# Patient Record
Sex: FEMALE | Race: BLACK OR AFRICAN AMERICAN | Employment: FULL TIME | URBAN - METROPOLITAN AREA
[De-identification: names, ages, dates, MRNs, and addresses within clinical notes are randomized per-mention and may not be internally consistent; named-entity substitution may affect disease eponyms.]

---

## 2023-03-07 DIAGNOSIS — E03.9 HYPOTHYROIDISM, UNSPECIFIED: ICD-10-CM

## 2023-03-14 RX ORDER — LEVOTHYROXINE SODIUM 75 UG/1
TABLET ORAL
Qty: 90 TABLET | Refills: 0 | Status: SHIPPED | OUTPATIENT
Start: 2023-03-14 | End: 2023-06-21

## 2023-04-09 DIAGNOSIS — E11.9 TYPE 2 DIABETES MELLITUS WITHOUT COMPLICATIONS (MULTI): ICD-10-CM

## 2023-04-13 RX ORDER — METFORMIN HYDROCHLORIDE 500 MG/1
TABLET, EXTENDED RELEASE ORAL
Qty: 180 TABLET | Refills: 2 | Status: SHIPPED | OUTPATIENT
Start: 2023-04-13 | End: 2024-01-24

## 2023-04-25 ENCOUNTER — OFFICE VISIT (OUTPATIENT)
Dept: PRIMARY CARE | Facility: CLINIC | Age: 68
End: 2023-04-25
Payer: MEDICARE

## 2023-04-25 ENCOUNTER — LAB (OUTPATIENT)
Dept: LAB | Facility: LAB | Age: 68
End: 2023-04-25
Payer: MEDICARE

## 2023-04-25 VITALS
WEIGHT: 231 LBS | BODY MASS INDEX: 43.61 KG/M2 | TEMPERATURE: 97.2 F | SYSTOLIC BLOOD PRESSURE: 124 MMHG | DIASTOLIC BLOOD PRESSURE: 76 MMHG | HEART RATE: 72 BPM | HEIGHT: 61 IN

## 2023-04-25 DIAGNOSIS — E78.2 HYPERLIPEMIA, MIXED: ICD-10-CM

## 2023-04-25 DIAGNOSIS — N18.31 CHRONIC KIDNEY DISEASE, STAGE 3A (MULTI): ICD-10-CM

## 2023-04-25 DIAGNOSIS — E66.01 MORBID OBESITY WITH BMI OF 40.0-44.9, ADULT (MULTI): ICD-10-CM

## 2023-04-25 DIAGNOSIS — Z79.4 TYPE 2 DIABETES MELLITUS WITH CHRONIC KIDNEY DISEASE, WITH LONG-TERM CURRENT USE OF INSULIN, UNSPECIFIED CKD STAGE (MULTI): ICD-10-CM

## 2023-04-25 DIAGNOSIS — E03.9 HYPOTHYROIDISM, UNSPECIFIED TYPE: ICD-10-CM

## 2023-04-25 DIAGNOSIS — D72.9 ABNORMAL WHITE BLOOD CELL COUNT: ICD-10-CM

## 2023-04-25 DIAGNOSIS — E11.22 TYPE 2 DIABETES MELLITUS WITH CHRONIC KIDNEY DISEASE, WITH LONG-TERM CURRENT USE OF INSULIN, UNSPECIFIED CKD STAGE (MULTI): ICD-10-CM

## 2023-04-25 DIAGNOSIS — E11.9 TYPE 2 DIABETES MELLITUS WITHOUT COMPLICATION, WITHOUT LONG-TERM CURRENT USE OF INSULIN (MULTI): ICD-10-CM

## 2023-04-25 DIAGNOSIS — E66.01 OBESITY, MORBID (MULTI): Primary | ICD-10-CM

## 2023-04-25 DIAGNOSIS — Z12.31 SCREENING MAMMOGRAM, ENCOUNTER FOR: ICD-10-CM

## 2023-04-25 DIAGNOSIS — N18.31 CHRONIC KIDNEY DISEASE, STAGE 3A (MULTI): Primary | ICD-10-CM

## 2023-04-25 PROBLEM — M17.11 PRIMARY LOCALIZED OSTEOARTHROSIS OF RIGHT LOWER LEG: Status: ACTIVE | Noted: 2023-04-25

## 2023-04-25 PROBLEM — R53.81 MALAISE: Status: ACTIVE | Noted: 2023-04-25

## 2023-04-25 PROBLEM — G89.29 CHRONIC BILATERAL LOW BACK PAIN WITHOUT SCIATICA: Status: ACTIVE | Noted: 2023-04-25

## 2023-04-25 PROBLEM — M54.50 CHRONIC BILATERAL LOW BACK PAIN WITHOUT SCIATICA: Status: ACTIVE | Noted: 2023-04-25

## 2023-04-25 PROBLEM — M25.569 KNEE PAIN: Status: ACTIVE | Noted: 2023-04-25

## 2023-04-25 PROBLEM — M10.9 GOUT: Status: ACTIVE | Noted: 2023-04-25

## 2023-04-25 PROBLEM — I10 BENIGN ESSENTIAL HYPERTENSION: Status: ACTIVE | Noted: 2023-04-25

## 2023-04-25 LAB
ALANINE AMINOTRANSFERASE (SGPT) (U/L) IN SER/PLAS: 13 U/L (ref 7–45)
ALBUMIN (G/DL) IN SER/PLAS: 4.4 G/DL (ref 3.4–5)
ALBUMIN (MG/L) IN URINE: <7 MG/L
ALBUMIN/CREATININE (UG/MG) IN URINE: NORMAL UG/MG CRT (ref 0–30)
ALKALINE PHOSPHATASE (U/L) IN SER/PLAS: 120 U/L (ref 33–136)
ANION GAP IN SER/PLAS: 17 MMOL/L (ref 10–20)
APPEARANCE, URINE: ABNORMAL
ASPARTATE AMINOTRANSFERASE (SGOT) (U/L) IN SER/PLAS: 19 U/L (ref 9–39)
BACTERIA, URINE: ABNORMAL /HPF
BASOPHILS (10*3/UL) IN BLOOD BY AUTOMATED COUNT: 0.05 X10E9/L (ref 0–0.1)
BASOPHILS/100 LEUKOCYTES IN BLOOD BY AUTOMATED COUNT: 0.4 % (ref 0–2)
BILIRUBIN TOTAL (MG/DL) IN SER/PLAS: 0.4 MG/DL (ref 0–1.2)
BILIRUBIN, URINE: NEGATIVE
BLOOD, URINE: NEGATIVE
BURR CELLS PRESENCE IN BLOOD BY LIGHT MICROSCOPY: NORMAL
CALCIDIOL (25 OH VITAMIN D3) (NG/ML) IN SER/PLAS: 27 NG/ML
CALCIUM (MG/DL) IN SER/PLAS: 10.4 MG/DL (ref 8.6–10.6)
CARBON DIOXIDE, TOTAL (MMOL/L) IN SER/PLAS: 27 MMOL/L (ref 21–32)
CHLORIDE (MMOL/L) IN SER/PLAS: 101 MMOL/L (ref 98–107)
CHOLESTEROL (MG/DL) IN SER/PLAS: 125 MG/DL (ref 0–199)
CHOLESTEROL IN HDL (MG/DL) IN SER/PLAS: 38 MG/DL
CHOLESTEROL/HDL RATIO: 3.3
COLOR, URINE: YELLOW
CREATININE (MG/DL) IN SER/PLAS: 1.11 MG/DL (ref 0.5–1.05)
CREATININE (MG/DL) IN URINE: 106 MG/DL (ref 20–320)
EOSINOPHILS (10*3/UL) IN BLOOD BY AUTOMATED COUNT: 0.08 X10E9/L (ref 0–0.7)
EOSINOPHILS/100 LEUKOCYTES IN BLOOD BY AUTOMATED COUNT: 0.7 % (ref 0–6)
ERYTHROCYTE DISTRIBUTION WIDTH (RATIO) BY AUTOMATED COUNT: 15.7 % (ref 11.5–14.5)
ERYTHROCYTE MEAN CORPUSCULAR HEMOGLOBIN CONCENTRATION (G/DL) BY AUTOMATED: 30.4 G/DL (ref 32–36)
ERYTHROCYTE MEAN CORPUSCULAR VOLUME (FL) BY AUTOMATED COUNT: 85 FL (ref 80–100)
ERYTHROCYTES (10*6/UL) IN BLOOD BY AUTOMATED COUNT: 5.01 X10E12/L (ref 4–5.2)
ESTIMATED AVERAGE GLUCOSE FOR HBA1C: 143 MG/DL
GFR FEMALE: 54 ML/MIN/1.73M2
GLUCOSE (MG/DL) IN SER/PLAS: 118 MG/DL (ref 74–99)
GLUCOSE, URINE: NEGATIVE MG/DL
HEMATOCRIT (%) IN BLOOD BY AUTOMATED COUNT: 42.4 % (ref 36–46)
HEMOGLOBIN (G/DL) IN BLOOD: 12.9 G/DL (ref 12–16)
HEMOGLOBIN A1C/HEMOGLOBIN TOTAL IN BLOOD: 6.6 %
IMMATURE GRANULOCYTES/100 LEUKOCYTES IN BLOOD BY AUTOMATED COUNT: 0.4 % (ref 0–0.9)
KETONES, URINE: NEGATIVE MG/DL
LDL: 53 MG/DL (ref 0–99)
LEUKOCYTE ESTERASE, URINE: ABNORMAL
LEUKOCYTES (10*3/UL) IN BLOOD BY AUTOMATED COUNT: 12 X10E9/L (ref 4.4–11.3)
LYMPHOCYTES (10*3/UL) IN BLOOD BY AUTOMATED COUNT: 5.41 X10E9/L (ref 1.2–4.8)
LYMPHOCYTES/100 LEUKOCYTES IN BLOOD BY AUTOMATED COUNT: 45 % (ref 13–44)
MONOCYTES (10*3/UL) IN BLOOD BY AUTOMATED COUNT: 0.77 X10E9/L (ref 0.1–1)
MONOCYTES/100 LEUKOCYTES IN BLOOD BY AUTOMATED COUNT: 6.4 % (ref 2–10)
MUCUS, URINE: ABNORMAL /LPF
NEUTROPHILS (10*3/UL) IN BLOOD BY AUTOMATED COUNT: 5.67 X10E9/L (ref 1.2–7.7)
NEUTROPHILS/100 LEUKOCYTES IN BLOOD BY AUTOMATED COUNT: 47.1 % (ref 40–80)
NITRITE, URINE: NEGATIVE
NRBC (PER 100 WBCS) BY AUTOMATED COUNT: 0 /100 WBC (ref 0–0)
PH, URINE: 6 (ref 5–8)
PLATELETS (10*3/UL) IN BLOOD AUTOMATED COUNT: 368 X10E9/L (ref 150–450)
POLYCHROMASIA IN BLOOD BY LIGHT MICROSCOPY: NORMAL
POTASSIUM (MMOL/L) IN SER/PLAS: 3.4 MMOL/L (ref 3.5–5.3)
PROTEIN TOTAL: 7.8 G/DL (ref 6.4–8.2)
PROTEIN, URINE: NEGATIVE MG/DL
RBC MORPHOLOGY IN BLOOD: NORMAL
RBC, URINE: 5 /HPF (ref 0–5)
SODIUM (MMOL/L) IN SER/PLAS: 142 MMOL/L (ref 136–145)
SPECIFIC GRAVITY, URINE: 1.01 (ref 1–1.03)
SQUAMOUS EPITHELIAL CELLS, URINE: 14 /HPF
THYROTROPIN (MIU/L) IN SER/PLAS BY DETECTION LIMIT <= 0.05 MIU/L: 2.79 MIU/L (ref 0.44–3.98)
TRIGLYCERIDE (MG/DL) IN SER/PLAS: 169 MG/DL (ref 0–149)
URATE (MG/DL) IN SER/PLAS: 6.4 MG/DL (ref 2.3–6.7)
UREA NITROGEN (MG/DL) IN SER/PLAS: 11 MG/DL (ref 6–23)
UROBILINOGEN, URINE: <2 MG/DL (ref 0–1.9)
VLDL: 34 MG/DL (ref 0–40)
WBC, URINE: 3 /HPF (ref 0–5)

## 2023-04-25 PROCEDURE — 3008F BODY MASS INDEX DOCD: CPT | Performed by: INTERNAL MEDICINE

## 2023-04-25 PROCEDURE — 85025 COMPLETE CBC W/AUTO DIFF WBC: CPT

## 2023-04-25 PROCEDURE — 82043 UR ALBUMIN QUANTITATIVE: CPT

## 2023-04-25 PROCEDURE — 3074F SYST BP LT 130 MM HG: CPT | Performed by: INTERNAL MEDICINE

## 2023-04-25 PROCEDURE — G0439 PPPS, SUBSEQ VISIT: HCPCS | Performed by: INTERNAL MEDICINE

## 2023-04-25 PROCEDURE — 36415 COLL VENOUS BLD VENIPUNCTURE: CPT

## 2023-04-25 PROCEDURE — 80053 COMPREHEN METABOLIC PANEL: CPT

## 2023-04-25 PROCEDURE — 84443 ASSAY THYROID STIM HORMONE: CPT

## 2023-04-25 PROCEDURE — 81001 URINALYSIS AUTO W/SCOPE: CPT

## 2023-04-25 PROCEDURE — 99214 OFFICE O/P EST MOD 30 MIN: CPT | Performed by: INTERNAL MEDICINE

## 2023-04-25 PROCEDURE — 3078F DIAST BP <80 MM HG: CPT | Performed by: INTERNAL MEDICINE

## 2023-04-25 PROCEDURE — 80061 LIPID PANEL: CPT

## 2023-04-25 PROCEDURE — 87086 URINE CULTURE/COLONY COUNT: CPT

## 2023-04-25 PROCEDURE — 1160F RVW MEDS BY RX/DR IN RCRD: CPT | Performed by: INTERNAL MEDICINE

## 2023-04-25 PROCEDURE — 82570 ASSAY OF URINE CREATININE: CPT

## 2023-04-25 PROCEDURE — 84550 ASSAY OF BLOOD/URIC ACID: CPT

## 2023-04-25 PROCEDURE — 1170F FXNL STATUS ASSESSED: CPT | Performed by: INTERNAL MEDICINE

## 2023-04-25 PROCEDURE — 1159F MED LIST DOCD IN RCRD: CPT | Performed by: INTERNAL MEDICINE

## 2023-04-25 PROCEDURE — 83036 HEMOGLOBIN GLYCOSYLATED A1C: CPT

## 2023-04-25 PROCEDURE — 82306 VITAMIN D 25 HYDROXY: CPT

## 2023-04-25 PROCEDURE — 1036F TOBACCO NON-USER: CPT | Performed by: INTERNAL MEDICINE

## 2023-04-25 RX ORDER — ALLOPURINOL 300 MG/1
300 TABLET ORAL DAILY
COMMUNITY
End: 2023-05-31

## 2023-04-25 RX ORDER — AMLODIPINE AND VALSARTAN 10; 320 MG/1; MG/1
1 TABLET ORAL DAILY
COMMUNITY
End: 2023-05-31

## 2023-04-25 RX ORDER — INDAPAMIDE 2.5 MG/1
1 TABLET ORAL DAILY
COMMUNITY
Start: 2015-04-07 | End: 2023-05-31

## 2023-04-25 RX ORDER — NAPROXEN SODIUM 220 MG/1
81 TABLET, FILM COATED ORAL DAILY
COMMUNITY
Start: 2019-05-30 | End: 2023-11-09 | Stop reason: ALTCHOICE

## 2023-04-25 SDOH — HEALTH STABILITY: PHYSICAL HEALTH: ON AVERAGE, HOW MANY MINUTES DO YOU ENGAGE IN EXERCISE AT THIS LEVEL?: 10 MIN

## 2023-04-25 SDOH — HEALTH STABILITY: PHYSICAL HEALTH: ON AVERAGE, HOW MANY DAYS PER WEEK DO YOU ENGAGE IN MODERATE TO STRENUOUS EXERCISE (LIKE A BRISK WALK)?: 1 DAY

## 2023-04-25 ASSESSMENT — PATIENT HEALTH QUESTIONNAIRE - PHQ9
2. FEELING DOWN, DEPRESSED OR HOPELESS: NOT AT ALL
SUM OF ALL RESPONSES TO PHQ9 QUESTIONS 1 AND 2: 0
1. LITTLE INTEREST OR PLEASURE IN DOING THINGS: NOT AT ALL
1. LITTLE INTEREST OR PLEASURE IN DOING THINGS: NOT AT ALL
2. FEELING DOWN, DEPRESSED OR HOPELESS: NOT AT ALL
SUM OF ALL RESPONSES TO PHQ9 QUESTIONS 1 & 2: 0

## 2023-04-25 ASSESSMENT — LIFESTYLE VARIABLES
HOW MANY STANDARD DRINKS CONTAINING ALCOHOL DO YOU HAVE ON A TYPICAL DAY: 1 OR 2
HOW OFTEN DO YOU HAVE SIX OR MORE DRINKS ON ONE OCCASION: NEVER
AUDIT-C TOTAL SCORE: 1
HOW OFTEN DO YOU HAVE A DRINK CONTAINING ALCOHOL: MONTHLY OR LESS
SKIP TO QUESTIONS 9-10: 1

## 2023-04-25 ASSESSMENT — ACTIVITIES OF DAILY LIVING (ADL)
TAKING_MEDICATION: INDEPENDENT
DOING_HOUSEWORK: INDEPENDENT
MANAGING_FINANCES: INDEPENDENT
GROCERY_SHOPPING: INDEPENDENT
DRESSING: INDEPENDENT
BATHING: INDEPENDENT

## 2023-04-25 ASSESSMENT — ENCOUNTER SYMPTOMS
ARTHRALGIAS: 1
BACK PAIN: 1

## 2023-04-25 NOTE — PROGRESS NOTES
"Subjective   Patient ID: Hope Dale is a 67 y.o. female who presents for Medicare Annual Wellness Visit Subsequent (Pt present today for wellness visit. ls).    Patient presents for wellness exam and follow-up.  She has been compliant with her medications, diet but not exercise.  She reports that her sugars are in the low 100s at home.  She denies any headaches, no dizziness, no chest pain or shortness of breath.  She denies abdominal pain no nausea vomiting or diarrhea.  She reports baseline back and knee pain.         Review of Systems   Musculoskeletal:  Positive for arthralgias and back pain.       Objective   /76   Pulse 72   Temp 36.2 °C (97.2 °F)   Ht 1.549 m (5' 1\")   Wt 105 kg (231 lb)   BMI 43.65 kg/m²     Physical Exam  Constitutional:       Appearance: Normal appearance.   Cardiovascular:      Rate and Rhythm: Normal rate and regular rhythm.      Heart sounds: No murmur heard.     No gallop.   Pulmonary:      Effort: No respiratory distress.      Breath sounds: No wheezing or rales.   Abdominal:      General: There is no distension.      Palpations: There is no mass.      Tenderness: There is no abdominal tenderness. There is no guarding.   Musculoskeletal:      Right lower leg: No edema.      Left lower leg: No edema.   Neurological:      Mental Status: She is alert.         Assessment/Plan   Diagnoses and all orders for this visit:  Obesity, morbid (CMS/HCC)-we will refer to nutritionist for further evaluation  Type 2 diabetes mellitus without complication, without long-term current use of insulin (CMS/[facility])-we will check hemoglobin A1c.  Ophthalmology appointment has been done.  Chronic kidney disease, stage 3a-we will recheck creatinine  -     CBC and Auto Differential; Future  -     Vitamin D, Total; Future  -     Uric Acid; Future  -     Urinalysis with Reflex Microscopic; Future  -     Albumin , Urine Random; Future  -     Comprehensive Metabolic Panel; Future  -     Hemoglobin A1c; " Future  Hypothyroidism, unspecified type-we will recheck TSH  -     TSH with reflex to Free T4 if abnormal; Future  Hyperlipemia, mixed-we will check a lipid profile  -     Lipid Panel; Future  Screening mammogram, encounter for  -     BI mammo bilateral screening tomosynthesis; Future  Back and knee pain-stable symptoms  Type 2 diabetes mellitus with chronic kidney disease, with long-term current use of insulin, unspecified CKD stage (CMS/[facility])  -     Hemoglobin A1c; Future  Morbid obesity with BMI of 40.0-44.9, adult (CMS/[facility])-we will refer to nutritionist.  Health maintenance-colonoscopy has been done.  We will schedule mammogram.  Bone density is up-to-date.  Refuses immunizations.  GYN appointment for Pap.  Back and knee pain-stable symptoms

## 2023-04-25 NOTE — PATIENT INSTRUCTIONS
Please take medication as prescribed.  Follow-up in 3 months.  Obtain fasting blood work and urine.  Schedule your mammogram.  Schedule appoint with nutritionist

## 2023-04-27 LAB — URINE CULTURE: ABNORMAL

## 2023-05-30 DIAGNOSIS — I10 ESSENTIAL (PRIMARY) HYPERTENSION: ICD-10-CM

## 2023-05-30 DIAGNOSIS — M10.9 GOUT, UNSPECIFIED: ICD-10-CM

## 2023-05-31 RX ORDER — AMLODIPINE AND VALSARTAN 10; 320 MG/1; MG/1
TABLET ORAL
Qty: 90 TABLET | Refills: 2 | Status: SHIPPED | OUTPATIENT
Start: 2023-05-31 | End: 2024-03-04

## 2023-05-31 RX ORDER — INDAPAMIDE 2.5 MG/1
TABLET ORAL
Qty: 90 TABLET | Refills: 2 | Status: SHIPPED | OUTPATIENT
Start: 2023-05-31 | End: 2024-03-04

## 2023-05-31 RX ORDER — ALLOPURINOL 300 MG/1
TABLET ORAL
Qty: 90 TABLET | Refills: 2 | Status: SHIPPED | OUTPATIENT
Start: 2023-05-31 | End: 2024-03-04

## 2023-07-27 ENCOUNTER — LAB (OUTPATIENT)
Dept: LAB | Facility: LAB | Age: 68
End: 2023-07-27
Payer: MEDICARE

## 2023-07-27 ENCOUNTER — OFFICE VISIT (OUTPATIENT)
Dept: PRIMARY CARE | Facility: CLINIC | Age: 68
End: 2023-07-27
Payer: MEDICARE

## 2023-07-27 VITALS
HEART RATE: 72 BPM | WEIGHT: 229 LBS | BODY MASS INDEX: 43.27 KG/M2 | DIASTOLIC BLOOD PRESSURE: 76 MMHG | SYSTOLIC BLOOD PRESSURE: 114 MMHG

## 2023-07-27 DIAGNOSIS — G89.29 CHRONIC BILATERAL LOW BACK PAIN WITHOUT SCIATICA: ICD-10-CM

## 2023-07-27 DIAGNOSIS — R31.9 HEMATURIA, UNSPECIFIED TYPE: Primary | ICD-10-CM

## 2023-07-27 DIAGNOSIS — I10 BENIGN ESSENTIAL HYPERTENSION: ICD-10-CM

## 2023-07-27 DIAGNOSIS — R53.81 MALAISE: ICD-10-CM

## 2023-07-27 DIAGNOSIS — E78.2 HYPERLIPEMIA, MIXED: ICD-10-CM

## 2023-07-27 DIAGNOSIS — E66.01 MORBID OBESITY WITH BMI OF 40.0-44.9, ADULT (MULTI): Primary | ICD-10-CM

## 2023-07-27 DIAGNOSIS — M25.569 CHRONIC KNEE PAIN, UNSPECIFIED LATERALITY: ICD-10-CM

## 2023-07-27 DIAGNOSIS — M54.50 CHRONIC BILATERAL LOW BACK PAIN WITHOUT SCIATICA: ICD-10-CM

## 2023-07-27 DIAGNOSIS — E11.9 TYPE 2 DIABETES MELLITUS WITHOUT COMPLICATION, WITHOUT LONG-TERM CURRENT USE OF INSULIN (MULTI): ICD-10-CM

## 2023-07-27 DIAGNOSIS — G89.29 CHRONIC KNEE PAIN, UNSPECIFIED LATERALITY: ICD-10-CM

## 2023-07-27 LAB
ANION GAP IN SER/PLAS: 17 MMOL/L (ref 10–20)
APPEARANCE, URINE: ABNORMAL
BASOPHILS (10*3/UL) IN BLOOD BY AUTOMATED COUNT: 0.05 X10E9/L (ref 0–0.1)
BASOPHILS/100 LEUKOCYTES IN BLOOD BY AUTOMATED COUNT: 0.6 % (ref 0–2)
BILIRUBIN, URINE: NEGATIVE
BLOOD, URINE: NEGATIVE
CALCIUM (MG/DL) IN SER/PLAS: 10.6 MG/DL (ref 8.6–10.6)
CARBON DIOXIDE, TOTAL (MMOL/L) IN SER/PLAS: 27 MMOL/L (ref 21–32)
CHLORIDE (MMOL/L) IN SER/PLAS: 101 MMOL/L (ref 98–107)
CHOLESTEROL (MG/DL) IN SER/PLAS: 115 MG/DL (ref 0–199)
CHOLESTEROL IN HDL (MG/DL) IN SER/PLAS: 35.8 MG/DL
CHOLESTEROL/HDL RATIO: 3.2
COLOR, URINE: YELLOW
CREATININE (MG/DL) IN SER/PLAS: 1.06 MG/DL (ref 0.5–1.05)
EOSINOPHILS (10*3/UL) IN BLOOD BY AUTOMATED COUNT: 0.05 X10E9/L (ref 0–0.7)
EOSINOPHILS/100 LEUKOCYTES IN BLOOD BY AUTOMATED COUNT: 0.6 % (ref 0–6)
ERYTHROCYTE DISTRIBUTION WIDTH (RATIO) BY AUTOMATED COUNT: 15.9 % (ref 11.5–14.5)
ERYTHROCYTE MEAN CORPUSCULAR HEMOGLOBIN CONCENTRATION (G/DL) BY AUTOMATED: 31.6 G/DL (ref 32–36)
ERYTHROCYTE MEAN CORPUSCULAR VOLUME (FL) BY AUTOMATED COUNT: 84 FL (ref 80–100)
ERYTHROCYTES (10*6/UL) IN BLOOD BY AUTOMATED COUNT: 4.45 X10E12/L (ref 4–5.2)
FERRITIN (UG/LL) IN SER/PLAS: 155 UG/L (ref 8–150)
GFR FEMALE: 56 ML/MIN/1.73M2
GLUCOSE (MG/DL) IN SER/PLAS: 121 MG/DL (ref 74–99)
GLUCOSE, URINE: NEGATIVE MG/DL
HEMATOCRIT (%) IN BLOOD BY AUTOMATED COUNT: 37.3 % (ref 36–46)
HEMOGLOBIN (G/DL) IN BLOOD: 11.8 G/DL (ref 12–16)
HYALINE CASTS, URINE: ABNORMAL /LPF
IMMATURE GRANULOCYTES/100 LEUKOCYTES IN BLOOD BY AUTOMATED COUNT: 0.3 % (ref 0–0.9)
IRON (UG/DL) IN SER/PLAS: 37 UG/DL (ref 35–150)
IRON BINDING CAPACITY (UG/DL) IN SER/PLAS: 299 UG/DL (ref 240–445)
IRON SATURATION (%) IN SER/PLAS: 12 % (ref 25–45)
KETONES, URINE: NEGATIVE MG/DL
LDL: 55 MG/DL (ref 0–99)
LEUKOCYTE ESTERASE, URINE: ABNORMAL
LEUKOCYTES (10*3/UL) IN BLOOD BY AUTOMATED COUNT: 9.1 X10E9/L (ref 4.4–11.3)
LYMPHOCYTES (10*3/UL) IN BLOOD BY AUTOMATED COUNT: 3.73 X10E9/L (ref 1.2–4.8)
LYMPHOCYTES/100 LEUKOCYTES IN BLOOD BY AUTOMATED COUNT: 41.1 % (ref 13–44)
MONOCYTES (10*3/UL) IN BLOOD BY AUTOMATED COUNT: 0.58 X10E9/L (ref 0.1–1)
MONOCYTES/100 LEUKOCYTES IN BLOOD BY AUTOMATED COUNT: 6.4 % (ref 2–10)
MUCUS, URINE: ABNORMAL /LPF
NEUTROPHILS (10*3/UL) IN BLOOD BY AUTOMATED COUNT: 4.64 X10E9/L (ref 1.2–7.7)
NEUTROPHILS/100 LEUKOCYTES IN BLOOD BY AUTOMATED COUNT: 51 % (ref 40–80)
NITRITE, URINE: NEGATIVE
NRBC (PER 100 WBCS) BY AUTOMATED COUNT: 0 /100 WBC (ref 0–0)
PH, URINE: 5 (ref 5–8)
PLATELETS (10*3/UL) IN BLOOD AUTOMATED COUNT: 273 X10E9/L (ref 150–450)
POC FINGERSTICK BLOOD GLUCOSE: 138 MG/DL (ref 70–100)
POC HEMOGLOBIN A1C: 6.6 % (ref 4.2–6.5)
POTASSIUM (MMOL/L) IN SER/PLAS: 3.8 MMOL/L (ref 3.5–5.3)
PROTEIN, URINE: NEGATIVE MG/DL
RBC MORPHOLOGY IN BLOOD: NORMAL
RBC, URINE: 22 /HPF (ref 0–5)
SODIUM (MMOL/L) IN SER/PLAS: 141 MMOL/L (ref 136–145)
SPECIFIC GRAVITY, URINE: 1.02 (ref 1–1.03)
SQUAMOUS EPITHELIAL CELLS, URINE: 64 /HPF
TRIGLYCERIDE (MG/DL) IN SER/PLAS: 119 MG/DL (ref 0–149)
UREA NITROGEN (MG/DL) IN SER/PLAS: 16 MG/DL (ref 6–23)
UROBILINOGEN, URINE: <2 MG/DL (ref 0–1.9)
VLDL: 24 MG/DL (ref 0–40)
WBC, URINE: 12 /HPF (ref 0–5)

## 2023-07-27 PROCEDURE — 1036F TOBACCO NON-USER: CPT | Performed by: INTERNAL MEDICINE

## 2023-07-27 PROCEDURE — 80048 BASIC METABOLIC PNL TOTAL CA: CPT

## 2023-07-27 PROCEDURE — 82728 ASSAY OF FERRITIN: CPT

## 2023-07-27 PROCEDURE — 3044F HG A1C LEVEL LT 7.0%: CPT | Performed by: INTERNAL MEDICINE

## 2023-07-27 PROCEDURE — 1160F RVW MEDS BY RX/DR IN RCRD: CPT | Performed by: INTERNAL MEDICINE

## 2023-07-27 PROCEDURE — 1159F MED LIST DOCD IN RCRD: CPT | Performed by: INTERNAL MEDICINE

## 2023-07-27 PROCEDURE — 83540 ASSAY OF IRON: CPT

## 2023-07-27 PROCEDURE — 80061 LIPID PANEL: CPT

## 2023-07-27 PROCEDURE — 83550 IRON BINDING TEST: CPT

## 2023-07-27 PROCEDURE — 3078F DIAST BP <80 MM HG: CPT | Performed by: INTERNAL MEDICINE

## 2023-07-27 PROCEDURE — 3008F BODY MASS INDEX DOCD: CPT | Performed by: INTERNAL MEDICINE

## 2023-07-27 PROCEDURE — 83036 HEMOGLOBIN GLYCOSYLATED A1C: CPT | Performed by: INTERNAL MEDICINE

## 2023-07-27 PROCEDURE — 36415 COLL VENOUS BLD VENIPUNCTURE: CPT

## 2023-07-27 PROCEDURE — 3074F SYST BP LT 130 MM HG: CPT | Performed by: INTERNAL MEDICINE

## 2023-07-27 PROCEDURE — 85025 COMPLETE CBC W/AUTO DIFF WBC: CPT

## 2023-07-27 PROCEDURE — 99214 OFFICE O/P EST MOD 30 MIN: CPT | Performed by: INTERNAL MEDICINE

## 2023-07-27 PROCEDURE — 81001 URINALYSIS AUTO W/SCOPE: CPT

## 2023-07-27 PROCEDURE — 82962 GLUCOSE BLOOD TEST: CPT | Performed by: INTERNAL MEDICINE

## 2023-07-27 ASSESSMENT — ENCOUNTER SYMPTOMS
POLYDIPSIA: 0
JOINT SWELLING: 0
FREQUENCY: 0
BACK PAIN: 0
EYE DISCHARGE: 0
BLOOD IN STOOL: 0
DIZZINESS: 0
NAUSEA: 0
HEADACHES: 0
FLANK PAIN: 0
FATIGUE: 0
BRUISES/BLEEDS EASILY: 0
SHORTNESS OF BREATH: 0
TROUBLE SWALLOWING: 0
ACTIVITY CHANGE: 0
VOICE CHANGE: 0
DIAPHORESIS: 0
NECK STIFFNESS: 0
NECK PAIN: 0
STRIDOR: 0
ADENOPATHY: 0
DIFFICULTY URINATING: 0
NUMBNESS: 0
ANAL BLEEDING: 0
EYE REDNESS: 0
RECTAL PAIN: 0
ARTHRALGIAS: 1
EYE PAIN: 0
MYALGIAS: 0
COLOR CHANGE: 0
SINUS PRESSURE: 0
FACIAL ASYMMETRY: 0
CONSTIPATION: 0
HEMATURIA: 0
POLYPHAGIA: 0
APPETITE CHANGE: 0
VOMITING: 0
TREMORS: 0
EYE ITCHING: 0
SEIZURES: 0
RHINORRHEA: 0
SINUS PAIN: 0
ABDOMINAL DISTENTION: 0
COUGH: 0
WOUND: 0
LIGHT-HEADEDNESS: 0
ABDOMINAL PAIN: 0
PHOTOPHOBIA: 0
CHOKING: 0
WHEEZING: 0
SPEECH DIFFICULTY: 0
DYSURIA: 0
SORE THROAT: 0
DIARRHEA: 0
SLEEP DISTURBANCE: 0
CHEST TIGHTNESS: 0
FACIAL SWELLING: 0
CHILLS: 0
WEAKNESS: 0
PALPITATIONS: 0

## 2023-07-27 NOTE — PROGRESS NOTES
Subjective   Patient ID: Hope Dale is a 68 y.o. female who presents for Medicare Annual Wellness Visit Subsequent.    Patient presents for follow-up.  She has been compliant with her medications and diet but not exercise.  She reports baseline arthritis symptoms involving her knees and hips.  She denies any headaches, no dizziness but does complain of allergy symptoms.  She denies any chest pain or shortness of breath, no abdominal pain no nausea vomiting or diarrhea.         Review of Systems   Constitutional:  Negative for activity change, appetite change, chills, diaphoresis and fatigue.   HENT:  Negative for congestion, dental problem, drooling, ear discharge, ear pain, facial swelling, hearing loss, mouth sores, nosebleeds, postnasal drip, rhinorrhea, sinus pressure, sinus pain, sneezing, sore throat, tinnitus, trouble swallowing and voice change.    Eyes:  Negative for photophobia, pain, discharge, redness, itching and visual disturbance.   Respiratory:  Negative for cough, choking, chest tightness, shortness of breath, wheezing and stridor.    Cardiovascular:  Negative for chest pain, palpitations and leg swelling.   Gastrointestinal:  Negative for abdominal distention, abdominal pain, anal bleeding, blood in stool, constipation, diarrhea, nausea, rectal pain and vomiting.   Endocrine: Negative for cold intolerance, heat intolerance, polydipsia, polyphagia and polyuria.   Genitourinary:  Negative for decreased urine volume, difficulty urinating, dysuria, enuresis, flank pain, frequency, genital sores, hematuria and urgency.   Musculoskeletal:  Positive for arthralgias. Negative for back pain, gait problem, joint swelling, myalgias, neck pain and neck stiffness.   Skin:  Negative for color change, pallor, rash and wound.   Neurological:  Negative for dizziness, tremors, seizures, syncope, facial asymmetry, speech difficulty, weakness, light-headedness, numbness and headaches.   Hematological:  Negative  for adenopathy. Does not bruise/bleed easily.   Psychiatric/Behavioral:  Negative for sleep disturbance.        Objective   /76   Pulse 72   Wt 104 kg (229 lb)   BMI 43.27 kg/m²     Physical Exam  Constitutional:       Appearance: Normal appearance.   Cardiovascular:      Rate and Rhythm: Normal rate and regular rhythm.      Heart sounds: No murmur heard.     No gallop.   Pulmonary:      Effort: No respiratory distress.      Breath sounds: No wheezing or rales.   Abdominal:      General: There is no distension.      Palpations: There is no mass.      Tenderness: There is no abdominal tenderness. There is no guarding.   Musculoskeletal:      Right lower leg: No edema.      Left lower leg: No edema.   Neurological:      Mental Status: She is alert.         Assessment/Plan   Diagnoses and all orders for this visit:  Morbid obesity with BMI of 40.0-44.9, adult (CMS/[facility])-diet and exercise  Type 2 diabetes mellitus without complication, without long-term current use of insulin (CMS/[facility])-hemoglobin A1c was 6.6.  Ophthalmology appointment has been done.  -     POCT Fingerstick Glucose manually resulted  -     POCT glycosylated hemoglobin (Hb A1C) manually resulted  Benign essential hypertension-low-salt diet and exercise okay  Malaise  Hyperlipemia, mixed-fuses medications.  Will check lipid profile  Chronic knee pain, unspecified laterality- stable symptoms  Chronic bilateral low back pain without sciatica  CRI- recheck creatinine  Elevated white blood cell count-we will recheck today.  Health maintenance-she will schedule her mammogram.  Refuses immunizations.  Colonoscopy has been done.  Hematuria-we will recheck a urinalysis  Hypokalemia-we will recheck potassium

## 2023-09-27 PROBLEM — E55.9 VITAMIN D DEFICIENCY: Status: ACTIVE | Noted: 2023-09-27

## 2023-09-27 PROBLEM — Q76.49 SPINE DEFORMITY: Status: ACTIVE | Noted: 2023-09-27

## 2023-09-27 PROBLEM — E07.9 THYROID DISEASE: Status: ACTIVE | Noted: 2023-09-27

## 2023-09-27 PROBLEM — I10 HYPERTENSION: Status: ACTIVE | Noted: 2023-09-27

## 2023-09-27 PROBLEM — R31.9 HEMATURIA: Status: ACTIVE | Noted: 2023-09-27

## 2023-09-27 PROBLEM — N95.1 MENOPAUSE SYNDROME: Status: ACTIVE | Noted: 2023-09-27

## 2023-09-27 PROBLEM — R73.9 HYPERGLYCEMIA: Status: ACTIVE | Noted: 2023-09-27

## 2023-09-27 PROBLEM — H83.02 LABYRINTHITIS OF LEFT EAR: Status: ACTIVE | Noted: 2023-09-27

## 2023-09-27 PROBLEM — R30.0 DYSURIA: Status: ACTIVE | Noted: 2023-09-27

## 2023-09-27 PROBLEM — M65.321 TRIGGER INDEX FINGER OF RIGHT HAND: Status: ACTIVE | Noted: 2023-09-27

## 2023-09-27 PROBLEM — R94.31 ABNORMAL EKG: Status: ACTIVE | Noted: 2023-09-27

## 2023-09-27 LAB
POC CREATININE: 0.7 MG/DL (ref 0.6–1.3)
POCT GFR - DATA CONVERSION: >60

## 2023-09-27 RX ORDER — ERGOCALCIFEROL 1.25 MG/1
1 CAPSULE ORAL WEEKLY
COMMUNITY
Start: 2022-04-14 | End: 2023-11-09 | Stop reason: ALTCHOICE

## 2023-10-03 ENCOUNTER — PROCEDURE VISIT (OUTPATIENT)
Dept: UROLOGY | Facility: CLINIC | Age: 68
End: 2023-10-03
Payer: MEDICARE

## 2023-10-03 VITALS
BODY MASS INDEX: 43.43 KG/M2 | DIASTOLIC BLOOD PRESSURE: 74 MMHG | SYSTOLIC BLOOD PRESSURE: 116 MMHG | HEART RATE: 87 BPM | WEIGHT: 230 LBS | HEIGHT: 61 IN | TEMPERATURE: 97.2 F

## 2023-10-03 DIAGNOSIS — R31.9 HEMATURIA, UNSPECIFIED TYPE: Primary | ICD-10-CM

## 2023-10-03 LAB
POC APPEARANCE, URINE: CLEAR
POC BILIRUBIN, URINE: NEGATIVE
POC BLOOD, URINE: ABNORMAL
POC COLOR, URINE: YELLOW
POC GLUCOSE, URINE: NEGATIVE MG/DL
POC KETONES, URINE: NEGATIVE MG/DL
POC LEUKOCYTES, URINE: ABNORMAL
POC NITRITE,URINE: NEGATIVE
POC PH, URINE: 5.5 PH
POC PROTEIN, URINE: NEGATIVE MG/DL
POC SPECIFIC GRAVITY, URINE: 1.01
POC UROBILINOGEN, URINE: 0.2 EU/DL

## 2023-10-03 PROCEDURE — 52000 CYSTOURETHROSCOPY: CPT | Performed by: UROLOGY

## 2023-10-03 PROCEDURE — 81002 URINALYSIS NONAUTO W/O SCOPE: CPT | Performed by: UROLOGY

## 2023-10-03 ASSESSMENT — PAIN SCALES - GENERAL: PAINLEVEL: 0-NO PAIN

## 2023-10-03 NOTE — PROGRESS NOTES
HPI  Hope Dale is a 68 y.o. female presenting for cystoscopy for microhematuria.    CTU: negative other than ovarian findings, will discuss with her GYN      Procedure:  Patient presents for Cystourethroscopy due to hematuria. UA results were reviewed.    Risks, benefits, and alternative were discussed in detail.  Patient appears to understand and agrees to proceed.  Patient has signed the procedure consent form.    Cystoscopy findings:  Introitus: unremarkable introitus finding  Prolapse:  none  Urethra: unremarkable urethra finding  Bladder: unremarkable bladder finding      Assessment/Plan   Microhematuria with Cystoscopy  -Cystoscopy was completed today due to microhematuria and demonstrated normal findings.  She will follow up with NP to receive a UA that will be once yearly.    Cystoscopy was completed today without complication and she tolerated the procedure well. The anterior urethra is normal. Careful inspection of the bladder revealed there to be no evidence of tumor, stones, foreign bodies or diverticula. There is clear efflux from each orifice.    The patient was advised that given a negative workup for microscopic hematuria we watch for resolution over the next 5 years. If the patients hematuria persists at the 5 year laurie a repeat workup will be performed and as long as this is negative this is considered a non life threatening form of microscopic hematuria.        Scribe Attestation  By signing my name below, I, Fan Tran   attest that this documentation has been prepared under the direction and in the presence of Mariola Suresh MD.

## 2023-10-04 ENCOUNTER — APPOINTMENT (OUTPATIENT)
Dept: UROLOGY | Facility: CLINIC | Age: 68
End: 2023-10-04
Payer: MEDICARE

## 2023-11-01 ENCOUNTER — APPOINTMENT (OUTPATIENT)
Dept: PRIMARY CARE | Facility: CLINIC | Age: 68
End: 2023-11-01
Payer: MEDICARE

## 2023-11-09 ENCOUNTER — OFFICE VISIT (OUTPATIENT)
Dept: OBSTETRICS AND GYNECOLOGY | Facility: CLINIC | Age: 68
End: 2023-11-09
Payer: MEDICARE

## 2023-11-09 VITALS
HEIGHT: 61 IN | BODY MASS INDEX: 43.61 KG/M2 | SYSTOLIC BLOOD PRESSURE: 122 MMHG | WEIGHT: 231 LBS | DIASTOLIC BLOOD PRESSURE: 78 MMHG

## 2023-11-09 DIAGNOSIS — R19.00 PELVIC MASS: Primary | ICD-10-CM

## 2023-11-09 DIAGNOSIS — R97.8 OTHER ABNORMAL TUMOR MARKERS: ICD-10-CM

## 2023-11-09 PROCEDURE — 1159F MED LIST DOCD IN RCRD: CPT | Performed by: OBSTETRICS & GYNECOLOGY

## 2023-11-09 PROCEDURE — 1160F RVW MEDS BY RX/DR IN RCRD: CPT | Performed by: OBSTETRICS & GYNECOLOGY

## 2023-11-09 PROCEDURE — 3078F DIAST BP <80 MM HG: CPT | Performed by: OBSTETRICS & GYNECOLOGY

## 2023-11-09 PROCEDURE — 1036F TOBACCO NON-USER: CPT | Performed by: OBSTETRICS & GYNECOLOGY

## 2023-11-09 PROCEDURE — 3044F HG A1C LEVEL LT 7.0%: CPT | Performed by: OBSTETRICS & GYNECOLOGY

## 2023-11-09 PROCEDURE — 3074F SYST BP LT 130 MM HG: CPT | Performed by: OBSTETRICS & GYNECOLOGY

## 2023-11-09 PROCEDURE — 99204 OFFICE O/P NEW MOD 45 MIN: CPT | Performed by: OBSTETRICS & GYNECOLOGY

## 2023-11-09 PROCEDURE — 3008F BODY MASS INDEX DOCD: CPT | Performed by: OBSTETRICS & GYNECOLOGY

## 2023-11-09 PROCEDURE — 1126F AMNT PAIN NOTED NONE PRSNT: CPT | Performed by: OBSTETRICS & GYNECOLOGY

## 2023-11-09 NOTE — PROGRESS NOTES
Hope Dale is a 68 y.o. female who presents with a chief complaint of Follow-up (Patient was referred for ovarian mass)      SUBJECTIVE  Patient presents adnexal mass.  She has a history of a hysterectomy in the past for  with an abnormal CT scan.  She was having urinary symptoms and had a CT urogram which showed uterine fibroids.  She is having no pain.  She had discussed this at length its about 6 cm in size but a bit better view is needed    Past Medical History:   Diagnosis Date    Elevated blood-pressure reading, without diagnosis of hypertension     Elevated blood pressure reading without diagnosis of hypertension    Other conditions influencing health status     Arthritis    Personal history of other diseases of the circulatory system     History of hypertension    Personal history of other diseases of the musculoskeletal system and connective tissue     Personal history of gout     Past Surgical History:   Procedure Laterality Date    COLONOSCOPY  2021    rpt -    FOOT SURGERY  2014    Foot Surgery    HAND TENDON SURGERY  2014    Hand Incision Tendon Sheath Of A Finger    HYSTERECTOMY  2014    Hysterectomy    KNEE SURGERY  2014    Knee Surgery     Social History     Socioeconomic History    Marital status:      Spouse name: None    Number of children: None    Years of education: None    Highest education level: None   Occupational History    None   Tobacco Use    Smoking status: Former     Packs/day: 0.25     Years: 17.00     Additional pack years: 0.00     Total pack years: 4.25     Types: Cigarettes     Quit date:      Years since quittin.8    Smokeless tobacco: Never   Vaping Use    Vaping Use: Never used   Substance and Sexual Activity    Alcohol use: Not Currently    Drug use: Never    Sexual activity: Defer   Other Topics Concern    None   Social History Narrative    None     Social Determinants of Health     Financial Resource Strain: Not on file  "  Food Insecurity: Not on file   Transportation Needs: Not on file   Physical Activity: Insufficiently Active (4/25/2023)    Exercise Vital Sign     Days of Exercise per Week: 1 day     Minutes of Exercise per Session: 10 min   Stress: Not on file   Social Connections: Not on file   Intimate Partner Violence: Not on file   Housing Stability: Not on file     Family History   Problem Relation Name Age of Onset    Aneurysm Mother      Heart failure Father      Kidney disease Brother      Hypertension Other      Diabetes Other         OB History   No obstetric history on file.       OBJECTIVE  No Known Allergies   (Not in a hospital admission)       Review of Systems  History obtained from the patient  General ROS: negative  Psychological ROS: negative  Gastrointestinal ROS: no abdominal pain, change in bowel habits, or black or bloody stools  Musculoskeletal ROS: negative  Physical Exam  General Appearance: awake, alert, oriented, in no acute distress, well developed, well nourished, and in no acute distress  Skin: there are no suspicious lesions or rashes of concern, skin color, texture, turgor are normal; there are no bruises, rashes or lesions.  Head/Face: NCAT  Eyes: No gross abnormalities., PERRL, and EOMI  Abdomen: Soft, non-tender, normal bowel sounds; no bruits, organomegaly or masses.  Extremities: Extremities warm to touch, pink, with no edema.  Musculoskeletal: negative    /78   Ht 1.549 m (5' 1\")   Wt 105 kg (231 lb)   BMI 43.65 kg/m²    Problem List Items Addressed This Visit    None  Visit Diagnoses       Pelvic mass    -  Primary    Relevant Orders    Cancer Antigen 125    Carcinoembryonic Antigen    Cancer Antigen 19-9    US pelvis transvaginal    Other abnormal tumor markers        Relevant Orders    Cancer Antigen 125    Carcinoembryonic Antigen    Cancer Antigen 19-9           Follow up after ultrasound      "

## 2023-11-13 ENCOUNTER — LAB (OUTPATIENT)
Dept: LAB | Facility: LAB | Age: 68
End: 2023-11-13
Payer: MEDICARE

## 2023-11-13 ENCOUNTER — HOSPITAL ENCOUNTER (OUTPATIENT)
Dept: RADIOLOGY | Facility: CLINIC | Age: 68
Discharge: HOME | End: 2023-11-13
Payer: MEDICARE

## 2023-11-13 DIAGNOSIS — D64.9 ANEMIA, UNSPECIFIED TYPE: ICD-10-CM

## 2023-11-13 DIAGNOSIS — D72.9 ABNORMAL WHITE BLOOD CELL COUNT: ICD-10-CM

## 2023-11-13 DIAGNOSIS — R97.8 OTHER ABNORMAL TUMOR MARKERS: ICD-10-CM

## 2023-11-13 DIAGNOSIS — D64.9 ANEMIA, UNSPECIFIED TYPE: Primary | ICD-10-CM

## 2023-11-13 DIAGNOSIS — E11.9 TYPE 2 DIABETES MELLITUS WITHOUT COMPLICATION, WITHOUT LONG-TERM CURRENT USE OF INSULIN (MULTI): ICD-10-CM

## 2023-11-13 DIAGNOSIS — R19.00 PELVIC MASS: ICD-10-CM

## 2023-11-13 DIAGNOSIS — E11.9 TYPE 2 DIABETES MELLITUS WITHOUT COMPLICATION, WITHOUT LONG-TERM CURRENT USE OF INSULIN (MULTI): Primary | ICD-10-CM

## 2023-11-13 DIAGNOSIS — N18.31 CHRONIC KIDNEY DISEASE, STAGE 3A (MULTI): ICD-10-CM

## 2023-11-13 LAB
ANION GAP SERPL CALC-SCNC: 15 MMOL/L (ref 10–20)
BASOPHILS # BLD AUTO: 0.08 X10*3/UL (ref 0–0.1)
BASOPHILS NFR BLD AUTO: 0.9 %
BUN SERPL-MCNC: 17 MG/DL (ref 6–23)
CALCIUM SERPL-MCNC: 10.1 MG/DL (ref 8.6–10.6)
CANCER AG125 SERPL-ACNC: <3 U/ML (ref 0–30.2)
CANCER AG19-9 SERPL-ACNC: <4 U/ML
CEA SERPL-MCNC: 1.9 UG/L
CHLORIDE SERPL-SCNC: 103 MMOL/L (ref 98–107)
CO2 SERPL-SCNC: 28 MMOL/L (ref 21–32)
CREAT SERPL-MCNC: 1.09 MG/DL (ref 0.5–1.05)
EOSINOPHIL # BLD AUTO: 0.11 X10*3/UL (ref 0–0.7)
EOSINOPHIL NFR BLD AUTO: 1.3 %
ERYTHROCYTE [DISTWIDTH] IN BLOOD BY AUTOMATED COUNT: 15.6 % (ref 11.5–14.5)
EST. AVERAGE GLUCOSE BLD GHB EST-MCNC: 140 MG/DL
FERRITIN SERPL-MCNC: 147 NG/ML (ref 8–150)
GFR SERPL CREATININE-BSD FRML MDRD: 55 ML/MIN/1.73M*2
GLUCOSE SERPL-MCNC: 131 MG/DL (ref 74–99)
HBA1C MFR BLD: 6.5 %
HCT VFR BLD AUTO: 38 % (ref 36–46)
HGB BLD-MCNC: 11.7 G/DL (ref 12–16)
IMM GRANULOCYTES # BLD AUTO: 0.03 X10*3/UL (ref 0–0.7)
IMM GRANULOCYTES NFR BLD AUTO: 0.4 % (ref 0–0.9)
IRON SATN MFR SERPL: 15 % (ref 25–45)
IRON SERPL-MCNC: 46 UG/DL (ref 35–150)
LYMPHOCYTES # BLD AUTO: 3.87 X10*3/UL (ref 1.2–4.8)
LYMPHOCYTES NFR BLD AUTO: 45.3 %
MCH RBC QN AUTO: 25.8 PG (ref 26–34)
MCHC RBC AUTO-ENTMCNC: 30.8 G/DL (ref 32–36)
MCV RBC AUTO: 84 FL (ref 80–100)
MONOCYTES # BLD AUTO: 0.62 X10*3/UL (ref 0.1–1)
MONOCYTES NFR BLD AUTO: 7.3 %
NEUTROPHILS # BLD AUTO: 3.83 X10*3/UL (ref 1.2–7.7)
NEUTROPHILS NFR BLD AUTO: 44.8 %
NRBC BLD-RTO: 0 /100 WBCS (ref 0–0)
PLATELET # BLD AUTO: 330 X10*3/UL (ref 150–450)
POTASSIUM SERPL-SCNC: 3.7 MMOL/L (ref 3.5–5.3)
RBC # BLD AUTO: 4.54 X10*6/UL (ref 4–5.2)
SODIUM SERPL-SCNC: 142 MMOL/L (ref 136–145)
TIBC SERPL-MCNC: 299 UG/DL (ref 240–445)
UIBC SERPL-MCNC: 253 UG/DL (ref 110–370)
WBC # BLD AUTO: 8.5 X10*3/UL (ref 4.4–11.3)

## 2023-11-13 PROCEDURE — 76830 TRANSVAGINAL US NON-OB: CPT

## 2023-11-13 PROCEDURE — 85025 COMPLETE CBC W/AUTO DIFF WBC: CPT

## 2023-11-13 PROCEDURE — 83550 IRON BINDING TEST: CPT

## 2023-11-13 PROCEDURE — 76830 TRANSVAGINAL US NON-OB: CPT | Performed by: RADIOLOGY

## 2023-11-13 PROCEDURE — 86304 IMMUNOASSAY TUMOR CA 125: CPT

## 2023-11-13 PROCEDURE — 76856 US EXAM PELVIC COMPLETE: CPT | Performed by: RADIOLOGY

## 2023-11-13 PROCEDURE — 83540 ASSAY OF IRON: CPT

## 2023-11-13 PROCEDURE — 82378 CARCINOEMBRYONIC ANTIGEN: CPT

## 2023-11-13 PROCEDURE — 86301 IMMUNOASSAY TUMOR CA 19-9: CPT

## 2023-11-13 PROCEDURE — 82728 ASSAY OF FERRITIN: CPT

## 2023-11-13 PROCEDURE — 83036 HEMOGLOBIN GLYCOSYLATED A1C: CPT

## 2023-11-13 PROCEDURE — 80048 BASIC METABOLIC PNL TOTAL CA: CPT

## 2023-11-13 PROCEDURE — 36415 COLL VENOUS BLD VENIPUNCTURE: CPT

## 2023-11-30 ENCOUNTER — OFFICE VISIT (OUTPATIENT)
Dept: OBSTETRICS AND GYNECOLOGY | Facility: CLINIC | Age: 68
End: 2023-11-30
Payer: MEDICARE

## 2023-11-30 VITALS
DIASTOLIC BLOOD PRESSURE: 80 MMHG | SYSTOLIC BLOOD PRESSURE: 124 MMHG | HEIGHT: 61 IN | WEIGHT: 230 LBS | BODY MASS INDEX: 43.43 KG/M2

## 2023-11-30 DIAGNOSIS — N83.209 CYST OF OVARY, UNSPECIFIED LATERALITY: Primary | ICD-10-CM

## 2023-11-30 PROCEDURE — 1159F MED LIST DOCD IN RCRD: CPT | Performed by: OBSTETRICS & GYNECOLOGY

## 2023-11-30 PROCEDURE — 3079F DIAST BP 80-89 MM HG: CPT | Performed by: OBSTETRICS & GYNECOLOGY

## 2023-11-30 PROCEDURE — 1126F AMNT PAIN NOTED NONE PRSNT: CPT | Performed by: OBSTETRICS & GYNECOLOGY

## 2023-11-30 PROCEDURE — 99214 OFFICE O/P EST MOD 30 MIN: CPT | Performed by: OBSTETRICS & GYNECOLOGY

## 2023-11-30 PROCEDURE — 3044F HG A1C LEVEL LT 7.0%: CPT | Performed by: OBSTETRICS & GYNECOLOGY

## 2023-11-30 PROCEDURE — 3008F BODY MASS INDEX DOCD: CPT | Performed by: OBSTETRICS & GYNECOLOGY

## 2023-11-30 PROCEDURE — 1160F RVW MEDS BY RX/DR IN RCRD: CPT | Performed by: OBSTETRICS & GYNECOLOGY

## 2023-11-30 PROCEDURE — 1036F TOBACCO NON-USER: CPT | Performed by: OBSTETRICS & GYNECOLOGY

## 2023-11-30 PROCEDURE — 3074F SYST BP LT 130 MM HG: CPT | Performed by: OBSTETRICS & GYNECOLOGY

## 2023-11-30 NOTE — PROGRESS NOTES
Hope Dale is a 68 y.o. female who presents with a chief complaint of Results      SUBJECTIVE  Patient presents to go over her ultrasound and tumor markers.  She does have a small cyst on her left ovary but her tumor markers were all within normal limits.  She and I discussed this at length we talked about ovarian cancer and about tumor markers and about her decreased risk because of the markers being normal.    Past Medical History:   Diagnosis Date    Elevated blood-pressure reading, without diagnosis of hypertension     Elevated blood pressure reading without diagnosis of hypertension    Other conditions influencing health status     Arthritis    Personal history of other diseases of the circulatory system     History of hypertension    Personal history of other diseases of the musculoskeletal system and connective tissue     Personal history of gout     Past Surgical History:   Procedure Laterality Date    COLONOSCOPY  2021    rpt     FOOT SURGERY  2014    Foot Surgery    HAND TENDON SURGERY  2014    Hand Incision Tendon Sheath Of A Finger    HYSTERECTOMY  2014    Hysterectomy    KNEE SURGERY  2014    Knee Surgery     Social History     Socioeconomic History    Marital status:      Spouse name: None    Number of children: None    Years of education: None    Highest education level: None   Occupational History    None   Tobacco Use    Smoking status: Former     Packs/day: 0.25     Years: 17.00     Additional pack years: 0.00     Total pack years: 4.25     Types: Cigarettes     Quit date:      Years since quittin.9    Smokeless tobacco: Never   Vaping Use    Vaping Use: Never used   Substance and Sexual Activity    Alcohol use: Not Currently    Drug use: Never    Sexual activity: Defer   Other Topics Concern    None   Social History Narrative    None     Social Determinants of Health     Financial Resource Strain: Not on file   Food Insecurity: Not on file  "  Transportation Needs: Not on file   Physical Activity: Insufficiently Active (4/25/2023)    Exercise Vital Sign     Days of Exercise per Week: 1 day     Minutes of Exercise per Session: 10 min   Stress: Not on file   Social Connections: Not on file   Intimate Partner Violence: Not on file   Housing Stability: Not on file     Family History   Problem Relation Name Age of Onset    Aneurysm Mother      Heart failure Father      Kidney disease Brother      Hypertension Other      Diabetes Other         OB History   No obstetric history on file.       OBJECTIVE  No Known Allergies   (Not in a hospital admission)       Review of Systems  History obtained from the patient  General ROS: negative  Gastrointestinal ROS: no abdominal pain, change in bowel habits, or black or bloody stools  Musculoskeletal ROS: negative  Physical Exam  General Appearance: awake, alert, oriented, in no acute distress, well developed, well nourished, and in no acute distress  Skin: there are no suspicious lesions or rashes of concern, skin color, texture, turgor are normal; there are no bruises, rashes or lesions.  Head/Face: NCAT  Eyes: No gross abnormalities., PERRL, and EOMI  Abdomen: Soft, non-tender, normal bowel sounds; no bruits, organomegaly or masses.  Extremities: Extremities warm to touch, pink, with no edema.  Musculoskeletal: negative    /80   Ht 1.549 m (5' 1\")   Wt 104 kg (230 lb)   BMI 43.46 kg/m²    Problem List Items Addressed This Visit    None  Visit Diagnoses       Cyst of ovary, unspecified laterality    -  Primary    Relevant Orders    US pelvis transvaginal        Patient felt reassured with the negative tumor markers.  She was given the choice of following with the ultrasound in 6 months or to have it removed laparoscopically.  She would like to try and see what the next ultrasound shows in 6 months.      "

## 2023-12-27 ENCOUNTER — APPOINTMENT (OUTPATIENT)
Dept: PRIMARY CARE | Facility: CLINIC | Age: 68
End: 2023-12-27
Payer: MEDICARE

## 2024-01-12 DIAGNOSIS — E03.9 HYPOTHYROIDISM, UNSPECIFIED: ICD-10-CM

## 2024-01-12 RX ORDER — LEVOTHYROXINE SODIUM 75 UG/1
75 TABLET ORAL DAILY
Qty: 90 TABLET | Refills: 0 | Status: SHIPPED | OUTPATIENT
Start: 2024-01-12 | End: 2024-04-11

## 2024-01-24 DIAGNOSIS — E11.9 TYPE 2 DIABETES MELLITUS WITHOUT COMPLICATIONS (MULTI): ICD-10-CM

## 2024-01-24 RX ORDER — METFORMIN HYDROCHLORIDE 500 MG/1
TABLET, EXTENDED RELEASE ORAL
Qty: 180 TABLET | Refills: 2 | Status: SHIPPED | OUTPATIENT
Start: 2024-01-24

## 2024-02-28 ENCOUNTER — OFFICE VISIT (OUTPATIENT)
Dept: PRIMARY CARE | Facility: CLINIC | Age: 69
End: 2024-02-28
Payer: MEDICARE

## 2024-02-28 VITALS
WEIGHT: 226 LBS | BODY MASS INDEX: 42.7 KG/M2 | SYSTOLIC BLOOD PRESSURE: 120 MMHG | DIASTOLIC BLOOD PRESSURE: 80 MMHG | HEART RATE: 72 BPM

## 2024-02-28 DIAGNOSIS — E03.9 HYPOTHYROIDISM, UNSPECIFIED TYPE: ICD-10-CM

## 2024-02-28 DIAGNOSIS — E11.22 TYPE 2 DIABETES MELLITUS WITH CHRONIC KIDNEY DISEASE, WITH LONG-TERM CURRENT USE OF INSULIN, UNSPECIFIED CKD STAGE (MULTI): ICD-10-CM

## 2024-02-28 DIAGNOSIS — I10 BENIGN ESSENTIAL HYPERTENSION: ICD-10-CM

## 2024-02-28 DIAGNOSIS — E78.2 HYPERLIPEMIA, MIXED: ICD-10-CM

## 2024-02-28 DIAGNOSIS — Z78.0 MENOPAUSE: Primary | ICD-10-CM

## 2024-02-28 DIAGNOSIS — M1A.9XX0 CHRONIC GOUT WITHOUT TOPHUS, UNSPECIFIED CAUSE, UNSPECIFIED SITE: ICD-10-CM

## 2024-02-28 DIAGNOSIS — Z79.4 TYPE 2 DIABETES MELLITUS WITH CHRONIC KIDNEY DISEASE, WITH LONG-TERM CURRENT USE OF INSULIN, UNSPECIFIED CKD STAGE (MULTI): ICD-10-CM

## 2024-02-28 DIAGNOSIS — E66.01 CLASS 3 SEVERE OBESITY DUE TO EXCESS CALORIES WITH SERIOUS COMORBIDITY AND BODY MASS INDEX (BMI) OF 40.0 TO 44.9 IN ADULT (MULTI): ICD-10-CM

## 2024-02-28 DIAGNOSIS — N18.31 CHRONIC KIDNEY DISEASE, STAGE 3A (MULTI): ICD-10-CM

## 2024-02-28 LAB
POC FINGERSTICK BLOOD GLUCOSE: 115 MG/DL (ref 70–100)
POC HEMOGLOBIN A1C: 6.9 % (ref 4.2–6.5)

## 2024-02-28 PROCEDURE — 3008F BODY MASS INDEX DOCD: CPT | Performed by: INTERNAL MEDICINE

## 2024-02-28 PROCEDURE — 1126F AMNT PAIN NOTED NONE PRSNT: CPT | Performed by: INTERNAL MEDICINE

## 2024-02-28 PROCEDURE — 1036F TOBACCO NON-USER: CPT | Performed by: INTERNAL MEDICINE

## 2024-02-28 PROCEDURE — 99214 OFFICE O/P EST MOD 30 MIN: CPT | Performed by: INTERNAL MEDICINE

## 2024-02-28 PROCEDURE — 3074F SYST BP LT 130 MM HG: CPT | Performed by: INTERNAL MEDICINE

## 2024-02-28 PROCEDURE — 3079F DIAST BP 80-89 MM HG: CPT | Performed by: INTERNAL MEDICINE

## 2024-02-28 PROCEDURE — 1160F RVW MEDS BY RX/DR IN RCRD: CPT | Performed by: INTERNAL MEDICINE

## 2024-02-28 PROCEDURE — G0439 PPPS, SUBSEQ VISIT: HCPCS | Performed by: INTERNAL MEDICINE

## 2024-02-28 PROCEDURE — 82962 GLUCOSE BLOOD TEST: CPT | Performed by: INTERNAL MEDICINE

## 2024-02-28 PROCEDURE — 1159F MED LIST DOCD IN RCRD: CPT | Performed by: INTERNAL MEDICINE

## 2024-02-28 PROCEDURE — 83036 HEMOGLOBIN GLYCOSYLATED A1C: CPT | Performed by: INTERNAL MEDICINE

## 2024-02-28 RX ORDER — ROSUVASTATIN CALCIUM 5 MG/1
5 TABLET, COATED ORAL DAILY
Qty: 30 TABLET | Refills: 5 | Status: SHIPPED | OUTPATIENT
Start: 2024-02-28 | End: 2024-08-26

## 2024-02-28 ASSESSMENT — ENCOUNTER SYMPTOMS
ARTHRALGIAS: 1
VOMITING: 0
SEIZURES: 0
BLOOD IN STOOL: 0
SPEECH DIFFICULTY: 0
HEADACHES: 0
CHILLS: 0
POLYPHAGIA: 0
COLOR CHANGE: 0
FATIGUE: 0
DIFFICULTY URINATING: 0
ABDOMINAL PAIN: 0
CHOKING: 0
ADENOPATHY: 0
SLEEP DISTURBANCE: 0
ANAL BLEEDING: 0
NAUSEA: 0
RECTAL PAIN: 0
NECK STIFFNESS: 0
DIZZINESS: 0
NECK PAIN: 0
SHORTNESS OF BREATH: 0
NUMBNESS: 0
VOICE CHANGE: 0
FACIAL ASYMMETRY: 0
CHEST TIGHTNESS: 0
SORE THROAT: 0
EYE DISCHARGE: 0
PHOTOPHOBIA: 0
FACIAL SWELLING: 0
EYE ITCHING: 0
APPETITE CHANGE: 0
FREQUENCY: 0
LIGHT-HEADEDNESS: 0
EYE REDNESS: 0
RHINORRHEA: 1
ACTIVITY CHANGE: 0
MYALGIAS: 0
SINUS PRESSURE: 0
DIAPHORESIS: 0
SINUS PAIN: 0
TROUBLE SWALLOWING: 0
HEMATURIA: 0
STRIDOR: 0
TREMORS: 0
COUGH: 0
WEAKNESS: 0
WHEEZING: 0
BRUISES/BLEEDS EASILY: 0
POLYDIPSIA: 0
DYSURIA: 0
FLANK PAIN: 0
PALPITATIONS: 0
DIARRHEA: 0
CONSTIPATION: 0
BACK PAIN: 1
EYE PAIN: 0
WOUND: 0
JOINT SWELLING: 0
ABDOMINAL DISTENTION: 0

## 2024-02-28 ASSESSMENT — PAIN SCALES - GENERAL: PAINLEVEL: 0-NO PAIN

## 2024-02-28 NOTE — PROGRESS NOTES
Subjective   Patient ID: Hope Dale is a 68 y.o. female who presents for Follow-up and Medicare Annual Wellness Visit Subsequent.    Patient presents for wellness exam and follow-up.  She has been compliant with her medications, diet but not exercise.  She reports baseline back pain.  She denies any headaches, no dizziness, does complain of allergy symptoms.  She denies any chest pain or shortness of breath, no abdominal pain no nausea vomiting or diarrhea.  She reports no new musculoskeletal complaints.         Review of Systems   Constitutional:  Negative for activity change, appetite change, chills, diaphoresis and fatigue.   HENT:  Positive for rhinorrhea. Negative for congestion, dental problem, drooling, ear discharge, ear pain, facial swelling, hearing loss, mouth sores, nosebleeds, postnasal drip, sinus pressure, sinus pain, sneezing, sore throat, tinnitus, trouble swallowing and voice change.    Eyes:  Negative for photophobia, pain, discharge, redness, itching and visual disturbance.   Respiratory:  Negative for cough, choking, chest tightness, shortness of breath, wheezing and stridor.    Cardiovascular:  Negative for chest pain, palpitations and leg swelling.   Gastrointestinal:  Negative for abdominal distention, abdominal pain, anal bleeding, blood in stool, constipation, diarrhea, nausea, rectal pain and vomiting.   Endocrine: Negative for cold intolerance, heat intolerance, polydipsia, polyphagia and polyuria.   Genitourinary:  Negative for decreased urine volume, difficulty urinating, dysuria, enuresis, flank pain, frequency, genital sores, hematuria and urgency.   Musculoskeletal:  Positive for arthralgias and back pain. Negative for gait problem, joint swelling, myalgias, neck pain and neck stiffness.   Skin:  Negative for color change, pallor, rash and wound.   Neurological:  Negative for dizziness, tremors, seizures, syncope, facial asymmetry, speech difficulty, weakness, light-headedness,  numbness and headaches.   Hematological:  Negative for adenopathy. Does not bruise/bleed easily.   Psychiatric/Behavioral:  Negative for sleep disturbance.        Objective   /80   Pulse 72   Wt 103 kg (226 lb)   BMI 42.70 kg/m²     Physical Exam  Constitutional:       Appearance: Normal appearance.   Cardiovascular:      Rate and Rhythm: Normal rate and regular rhythm.      Heart sounds: No murmur heard.     No gallop.   Pulmonary:      Effort: No respiratory distress.      Breath sounds: No wheezing or rales.   Abdominal:      General: There is no distension.      Palpations: There is no mass.      Tenderness: There is no abdominal tenderness. There is no guarding.   Musculoskeletal:      Right lower leg: No edema.      Left lower leg: No edema.   Neurological:      Mental Status: She is alert.         Assessment/Plan   Diagnoses and all orders for this visit:  Menopause-schedule bone density  -     XR DEXA bone density; Future  Body mass index (BMI) 40.0-44.9, adult (CMS/[facility])-diet and exercise  Chronic kidney disease, stage 3a (CMS/[facility])-creatinine has been stable  Type 2 diabetes mellitus with chronic kidney disease, with long-term current use of insulin, unspecified CKD stage (CMS/[facility])-hemoglobin A1c was 6.9.  Diet and exercise.  Refuses medication adjustment  -     POCT glycosylated hemoglobin (Hb A1C) manually resulted  -     POCT Fingerstick Glucose manually resulted  Hyperlipemia, mixed-schedule cardiac score.  Start Crestor.  -     CT cardiac scoring wo IV contrast; Future  -     rosuvastatin (Crestor) 5 mg tablet; Take 1 tablet (5 mg) by mouth once daily.  Class 3 severe obesity due to excess calories with serious comorbidity and body mass index (BMI) of 40.0 to 44.9 in adult (CMS/[facility])-diet and exercise  Benign essential hypertension-stable on present medication  Hypothyroidism, unspecified type-continue with Synthroid  Chronic gout without tophus, unspecified cause, unspecified site-we will  continue with allopurinol.  Health maintenance-colonoscopy has been done.  Refuses immunizations.  Mammogram has been done.  Will schedule bone density.  Advance care planning discussed.  She will schedule an eye and dental appointment

## 2024-03-02 DIAGNOSIS — M10.9 GOUT, UNSPECIFIED: ICD-10-CM

## 2024-03-02 DIAGNOSIS — I10 ESSENTIAL (PRIMARY) HYPERTENSION: ICD-10-CM

## 2024-03-04 RX ORDER — ALLOPURINOL 300 MG/1
TABLET ORAL
Qty: 90 TABLET | Refills: 2 | Status: SHIPPED | OUTPATIENT
Start: 2024-03-04

## 2024-03-04 RX ORDER — AMLODIPINE AND VALSARTAN 10; 320 MG/1; MG/1
TABLET ORAL
Qty: 90 TABLET | Refills: 2 | Status: SHIPPED | OUTPATIENT
Start: 2024-03-04

## 2024-03-04 RX ORDER — INDAPAMIDE 2.5 MG/1
TABLET ORAL
Qty: 90 TABLET | Refills: 2 | Status: SHIPPED | OUTPATIENT
Start: 2024-03-04

## 2024-03-19 ENCOUNTER — HOSPITAL ENCOUNTER (OUTPATIENT)
Dept: RADIOLOGY | Facility: CLINIC | Age: 69
End: 2024-03-19
Payer: MEDICARE

## 2024-05-21 ENCOUNTER — APPOINTMENT (OUTPATIENT)
Dept: OBSTETRICS AND GYNECOLOGY | Facility: CLINIC | Age: 69
End: 2024-05-21
Payer: MEDICARE

## 2024-05-28 ENCOUNTER — APPOINTMENT (OUTPATIENT)
Dept: PRIMARY CARE | Facility: CLINIC | Age: 69
End: 2024-05-28
Payer: MEDICARE

## 2024-05-30 ENCOUNTER — APPOINTMENT (OUTPATIENT)
Dept: RADIOLOGY | Facility: CLINIC | Age: 69
End: 2024-05-30
Payer: MEDICARE

## 2024-06-06 ENCOUNTER — APPOINTMENT (OUTPATIENT)
Dept: PRIMARY CARE | Facility: CLINIC | Age: 69
End: 2024-06-06
Payer: MEDICARE

## 2024-06-13 ENCOUNTER — APPOINTMENT (OUTPATIENT)
Dept: PRIMARY CARE | Facility: CLINIC | Age: 69
End: 2024-06-13
Payer: MEDICARE

## 2024-07-02 DIAGNOSIS — E78.2 HYPERLIPEMIA, MIXED: ICD-10-CM

## 2024-07-02 RX ORDER — ROSUVASTATIN CALCIUM 5 MG/1
5 TABLET, COATED ORAL DAILY
Qty: 90 TABLET | Refills: 1 | Status: SHIPPED | OUTPATIENT
Start: 2024-07-02

## 2024-07-09 ENCOUNTER — APPOINTMENT (OUTPATIENT)
Dept: PRIMARY CARE | Facility: CLINIC | Age: 69
End: 2024-07-09
Payer: MEDICARE

## 2024-07-21 DIAGNOSIS — E03.9 HYPOTHYROIDISM, UNSPECIFIED: ICD-10-CM

## 2024-07-22 RX ORDER — LEVOTHYROXINE SODIUM 75 UG/1
75 TABLET ORAL DAILY
Qty: 90 TABLET | Refills: 0 | Status: SHIPPED | OUTPATIENT
Start: 2024-07-22

## 2024-07-25 ENCOUNTER — APPOINTMENT (OUTPATIENT)
Dept: PRIMARY CARE | Facility: CLINIC | Age: 69
End: 2024-07-25
Payer: MEDICARE

## 2024-07-25 VITALS
BODY MASS INDEX: 42.03 KG/M2 | SYSTOLIC BLOOD PRESSURE: 126 MMHG | HEIGHT: 61 IN | WEIGHT: 222.6 LBS | DIASTOLIC BLOOD PRESSURE: 78 MMHG | HEART RATE: 76 BPM

## 2024-07-25 DIAGNOSIS — E11.9 TYPE 2 DIABETES MELLITUS WITHOUT COMPLICATION, WITHOUT LONG-TERM CURRENT USE OF INSULIN (MULTI): ICD-10-CM

## 2024-07-25 DIAGNOSIS — E66.01 MORBID OBESITY WITH BMI OF 40.0-44.9, ADULT (MULTI): ICD-10-CM

## 2024-07-25 DIAGNOSIS — Z12.31 SCREENING MAMMOGRAM FOR BREAST CANCER: ICD-10-CM

## 2024-07-25 DIAGNOSIS — M54.50 CHRONIC BILATERAL LOW BACK PAIN WITHOUT SCIATICA: ICD-10-CM

## 2024-07-25 DIAGNOSIS — M17.11 PRIMARY LOCALIZED OSTEOARTHROSIS OF RIGHT LOWER LEG: ICD-10-CM

## 2024-07-25 DIAGNOSIS — E78.2 HYPERLIPEMIA, MIXED: ICD-10-CM

## 2024-07-25 DIAGNOSIS — I10 BENIGN ESSENTIAL HYPERTENSION: Primary | ICD-10-CM

## 2024-07-25 DIAGNOSIS — E55.9 VITAMIN D DEFICIENCY: ICD-10-CM

## 2024-07-25 DIAGNOSIS — R53.81 MALAISE: ICD-10-CM

## 2024-07-25 DIAGNOSIS — G89.29 CHRONIC BILATERAL LOW BACK PAIN WITHOUT SCIATICA: ICD-10-CM

## 2024-07-25 DIAGNOSIS — N18.31 CHRONIC KIDNEY DISEASE, STAGE 3A (MULTI): ICD-10-CM

## 2024-07-25 LAB
POC FINGERSTICK BLOOD GLUCOSE: 123 MG/DL (ref 70–100)
POC HEMOGLOBIN A1C: 6.8 % (ref 4.2–6.5)

## 2024-07-25 PROCEDURE — 83036 HEMOGLOBIN GLYCOSYLATED A1C: CPT | Performed by: INTERNAL MEDICINE

## 2024-07-25 PROCEDURE — 3008F BODY MASS INDEX DOCD: CPT | Performed by: INTERNAL MEDICINE

## 2024-07-25 PROCEDURE — 82962 GLUCOSE BLOOD TEST: CPT | Performed by: INTERNAL MEDICINE

## 2024-07-25 PROCEDURE — 1159F MED LIST DOCD IN RCRD: CPT | Performed by: INTERNAL MEDICINE

## 2024-07-25 PROCEDURE — 3074F SYST BP LT 130 MM HG: CPT | Performed by: INTERNAL MEDICINE

## 2024-07-25 PROCEDURE — 1160F RVW MEDS BY RX/DR IN RCRD: CPT | Performed by: INTERNAL MEDICINE

## 2024-07-25 PROCEDURE — 3078F DIAST BP <80 MM HG: CPT | Performed by: INTERNAL MEDICINE

## 2024-07-25 PROCEDURE — 99214 OFFICE O/P EST MOD 30 MIN: CPT | Performed by: INTERNAL MEDICINE

## 2024-07-25 PROCEDURE — 1126F AMNT PAIN NOTED NONE PRSNT: CPT | Performed by: INTERNAL MEDICINE

## 2024-07-25 PROCEDURE — 1124F ACP DISCUSS-NO DSCNMKR DOCD: CPT | Performed by: INTERNAL MEDICINE

## 2024-07-25 ASSESSMENT — ENCOUNTER SYMPTOMS
VOICE CHANGE: 0
SORE THROAT: 0
BLOOD IN STOOL: 0
LIGHT-HEADEDNESS: 0
CHEST TIGHTNESS: 0
ABDOMINAL DISTENTION: 0
DYSURIA: 0
ARTHRALGIAS: 1
MYALGIAS: 0
RECTAL PAIN: 0
BACK PAIN: 0
FLANK PAIN: 0
DEPRESSION: 1
FREQUENCY: 0
COLOR CHANGE: 0
SINUS PRESSURE: 0
FATIGUE: 0
DIZZINESS: 0
SLEEP DISTURBANCE: 0
NAUSEA: 0
DIAPHORESIS: 0
DIFFICULTY URINATING: 0
ADENOPATHY: 0
OCCASIONAL FEELINGS OF UNSTEADINESS: 1
EYE REDNESS: 0
EYE DISCHARGE: 0
NECK PAIN: 0
SEIZURES: 0
TREMORS: 0
PHOTOPHOBIA: 0
TROUBLE SWALLOWING: 0
EYE ITCHING: 0
APPETITE CHANGE: 0
CHOKING: 0
NUMBNESS: 0
WEAKNESS: 0
STRIDOR: 0
NECK STIFFNESS: 0
SINUS PAIN: 0
ANAL BLEEDING: 0
SPEECH DIFFICULTY: 0
CHILLS: 0
PALPITATIONS: 0
ACTIVITY CHANGE: 0
JOINT SWELLING: 0
POLYDIPSIA: 0
LOSS OF SENSATION IN FEET: 0
RHINORRHEA: 0
WHEEZING: 0
DIARRHEA: 0
HEMATURIA: 0
COUGH: 0
FACIAL ASYMMETRY: 0
VOMITING: 0
SHORTNESS OF BREATH: 0
WOUND: 0
HEADACHES: 0
BRUISES/BLEEDS EASILY: 0
FACIAL SWELLING: 0
CONSTIPATION: 0
EYE PAIN: 0
ABDOMINAL PAIN: 0
POLYPHAGIA: 0

## 2024-07-25 ASSESSMENT — PAIN SCALES - GENERAL: PAINLEVEL: 0-NO PAIN

## 2024-07-25 ASSESSMENT — PATIENT HEALTH QUESTIONNAIRE - PHQ9
SUM OF ALL RESPONSES TO PHQ9 QUESTIONS 1 AND 2: 0
1. LITTLE INTEREST OR PLEASURE IN DOING THINGS: NOT AT ALL
2. FEELING DOWN, DEPRESSED OR HOPELESS: NOT AT ALL

## 2024-07-25 NOTE — PROGRESS NOTES
Subjective   Patient ID: Hope Dale is a 69 y.o. female who presents for Medicare Annual Wellness Visit Subsequent.    Patient presents for follow-up.  She has been compliant with her medications, diet but not exercise.  Her daughter has been ill.  She is complaining of increased stress in her life.  She reports baseline knee pain.  She denies any headaches, no dizziness, no chest pain or shortness of breath.  She denies abdominal pain no nausea vomiting or diarrhea.         Review of Systems   Constitutional:  Negative for activity change, appetite change, chills, diaphoresis and fatigue.   HENT:  Negative for congestion, dental problem, drooling, ear discharge, ear pain, facial swelling, hearing loss, mouth sores, nosebleeds, postnasal drip, rhinorrhea, sinus pressure, sinus pain, sneezing, sore throat, tinnitus, trouble swallowing and voice change.    Eyes:  Negative for photophobia, pain, discharge, redness, itching and visual disturbance.   Respiratory:  Negative for cough, choking, chest tightness, shortness of breath, wheezing and stridor.    Cardiovascular:  Negative for chest pain, palpitations and leg swelling.   Gastrointestinal:  Negative for abdominal distention, abdominal pain, anal bleeding, blood in stool, constipation, diarrhea, nausea, rectal pain and vomiting.   Endocrine: Negative for cold intolerance, heat intolerance, polydipsia, polyphagia and polyuria.   Genitourinary:  Negative for decreased urine volume, difficulty urinating, dysuria, enuresis, flank pain, frequency, genital sores, hematuria and urgency.   Musculoskeletal:  Positive for arthralgias. Negative for back pain, gait problem, joint swelling, myalgias, neck pain and neck stiffness.   Skin:  Negative for color change, pallor, rash and wound.   Neurological:  Negative for dizziness, tremors, seizures, syncope, facial asymmetry, speech difficulty, weakness, light-headedness, numbness and headaches.   Hematological:  Negative for  "adenopathy. Does not bruise/bleed easily.   Psychiatric/Behavioral:  Negative for sleep disturbance.        Objective   /78   Pulse 76   Ht 1.549 m (5' 1\")   Wt 101 kg (222 lb 9.6 oz)   BMI 42.06 kg/m²     Physical Exam  Constitutional:       Appearance: Normal appearance.   Cardiovascular:      Rate and Rhythm: Normal rate and regular rhythm.      Heart sounds: No murmur heard.     No gallop.   Pulmonary:      Effort: No respiratory distress.      Breath sounds: No wheezing or rales.   Abdominal:      General: There is no distension.      Palpations: There is no mass.      Tenderness: There is no abdominal tenderness. There is no guarding.   Musculoskeletal:      Right lower leg: No edema.      Left lower leg: No edema.   Neurological:      Mental Status: She is alert.         Assessment/Plan   Diagnoses and all orders for this visit:  Benign essential hypertension-low-salt diet and exercise  -     Uric Acid; Future  -     Urinalysis with Reflex Microscopic; Future  -     Albumin-Creatinine Ratio, Urine Random; Future  -     Comprehensive Metabolic Panel; Future  Type 2 diabetes mellitus without complication, without long-term current use of insulin (Multi)-hemoglobin A1c was 6.9.  She will schedule appointment with ophthalmology.  Will start Ozempic.  She denies any history of pancreatitis, family history of medullary thyroid cancer or multiple endocrine neoplasia.  Will schedule appointment with pharmacy team.  She will diet and exercise  -     POCT glycosylated hemoglobin (Hb A1C) manually resulted  -     POCT Fingerstick Glucose manually resulted  Hyperlipemia, mixed  -     Lipid Panel; Future  Malaise  -     CBC and Auto Differential; Future  -     TSH with reflex to Free T4 if abnormal; Future  Vitamin D deficiency  -     Vitamin D 25-Hydroxy,Total (for eval of Vitamin D levels); Future  Chronic kidney disease, stage 3a (Multi)-recheck creatinine  Chronic bilateral low back pain without " sciatica-stable symptoms  Primary localized osteoarthrosis of right lower leg-follow-up with orthopedics  Morbid obesity with BMI of 40.0-44.9, adult (Multi)-will start Ozempic  Health maintenance-colonoscopy has been done.  Will schedule mammogram and bone density.  Pap with GYN.  She will schedule eye and dental appointment.  Refuses immunizations

## 2024-07-31 ENCOUNTER — HOSPITAL ENCOUNTER (OUTPATIENT)
Dept: RADIOLOGY | Facility: CLINIC | Age: 69
Discharge: HOME | End: 2024-07-31
Payer: MEDICARE

## 2024-07-31 DIAGNOSIS — E78.2 HYPERLIPEMIA, MIXED: ICD-10-CM

## 2024-07-31 PROCEDURE — 75571 CT HRT W/O DYE W/CA TEST: CPT

## 2024-08-21 NOTE — PROGRESS NOTES
Clinical Pharmacist Visit    Patient is sent at the request of Arpan Gillespie MD for my opinion regarding Type 2 diabetes.  My final recommendations will be communicated back to the requesting provider by way of shared medical record.    Subjective     Patient referred for T2DM management. She was recently started on Ozempic, which will need titration. Her current HgA1c is below goal.       Past Medical History:  She has a past medical history of Elevated blood-pressure reading, without diagnosis of hypertension, Other conditions influencing health status, Personal history of other diseases of the circulatory system, and Personal history of other diseases of the musculoskeletal system and connective tissue.    Past Surgical History:  She has a past surgical history that includes Hysterectomy (06/30/2014); Foot surgery (06/30/2014); Hand tendon surgery (06/30/2014); Knee surgery (06/30/2014); and Colonoscopy (01/2021).    Social History:  She reports that she quit smoking about 41 years ago. Her smoking use included cigarettes. She started smoking about 58 years ago. She has a 4.3 pack-year smoking history. She has never used smokeless tobacco. She reports that she does not currently use alcohol. She reports that she does not use drugs.    Family History:  Family History   Problem Relation Name Age of Onset    Aneurysm Mother      Heart failure Father      Kidney disease Brother      Hypertension Other      Diabetes Other         Allergies:  Patient has no known allergies.    Current diet:  eating better and smaller portions since starting ozempic, less cravings  Current exercise: walking    The patient does not have a known family history of diabetes.    Hypoglycemia frequency: never  Hypoglycemia awareness: Yes     Adverse Effects:   Constipation - recommended miralax PRN   Nausea - recommended taking at bedtime    Objective     Last Recorded Vitals:  BP Readings from Last 6 Encounters:   07/25/24 126/78  "  02/28/24 120/80   11/30/23 124/80   11/09/23 122/78   10/03/23 116/74   07/27/23 114/76        Wt Readings from Last 6 Encounters:   07/25/24 101 kg (222 lb 9.6 oz)   02/28/24 103 kg (226 lb)   11/30/23 104 kg (230 lb)   11/09/23 105 kg (231 lb)   10/03/23 104 kg (230 lb)   07/27/23 104 kg (229 lb)       Diabetes Pharmacotherapy:  Current medications:  - Ozempic 0.25 mg weekly  - Metformin  mg BID - only takes it once a day due to diarrhea     Previous medications:   - none     Weight loss:   Baseline weight: 224  Current weight: 218    Primary/Secondary Prevention   - Statin? Yes  - ACE-I/ARB? Yes  - Aspirin? No - upsets her stomach     Pertinent PMH Review:  - PMH of Pancreatitis: No  - PMH of Retinopathy: No  - PMH of Urinary Tract Infections: No  - PMH of MTC: No    Lab Review  Lab Results   Component Value Date    BILITOT 0.4 04/25/2023    CALCIUM 10.1 11/13/2023    CO2 28 11/13/2023     11/13/2023    CREATININE 1.09 (H) 11/13/2023    GLUCOSE 131 (H) 11/13/2023    ALKPHOS 120 04/25/2023    K 3.7 11/13/2023    PROT 7.8 04/25/2023     11/13/2023    AST 19 04/25/2023    ALT 13 04/25/2023    BUN 17 11/13/2023    ANIONGAP 15 11/13/2023    ALBUMIN 4.4 04/25/2023    GFRF 56 (A) 07/27/2023     Lab Results   Component Value Date    TRIG 119 07/27/2023    CHOL 115 07/27/2023    HDL 35.8 (A) 07/27/2023     Lab Results   Component Value Date    HGBA1C 6.8 (A) 07/25/2024    HGBA1C 6.9 (A) 02/28/2024    HGBA1C 6.5 (H) 11/13/2023     No components found for: \"UACR\"  The ASCVD Risk score (Zenobia DK, et al., 2019) failed to calculate for the following reasons:    The valid total cholesterol range is 130 to 320 mg/dL    Unable to determine if patient is Non-     Health Maintenance:   Foot Exam: checks daily  Eye Exam: 1 year ago - due   Lipid Panel: ordered - needs completed   Urine Albumin: ordered - needs completed   Influenza Immunization: refuses   Pneumonia Immunization: " refuses    Drug Interactions:  None requiring intervention     Assessment/Plan   Problem List Items Addressed This Visit       Type 2 diabetes mellitus without complication, without long-term current use of insulin (Multi)    Relevant Orders    Follow Up In [facility] - Pharmacy     Patient has been experiencing some nausea and constipation since starting Ozempic. She said it is tolerable. She has been eating more salads and drinking more water which has helped with the constipation. She is ready for the increased dose of Ozempic with the goal to stop metformin in the future because it dose cause her some diarrhea. She has started to see some weight loss since starting, down 6 lbs. Will need refill at next visit.    Patients diabetes is stable with most recent A1c of 6.8% on 7/25/24 (Goal < 7%).   Increase: Ozempic 0.25 mg to 0.5 mg once weekly - takes on Sundays (first dose will be 8/25/24)  Continue: Metformin  mg BID  Compliance at present is estimated to be excellent. Efforts to improve compliance (if necessary) will be directed at increased exercise.  Education Provided to Patient:  Ozempic  Education:  - Counseled patient on Ozempic MOA, expectations, administration, and monitoring parameters.   - Counseled patient that Ozempic may cause some GI adverse effects (upset stomach/diarrhea/constipation/nausea/vomiting) when starting out and ways to mitigate include eating smaller meals and limiting greasy or deep fried foods as this increases risk of nausea.   - Advised patient that they may experience improved satiety after meals and portion sizes of meals may be reduced as doses of Ozempic increase.   - Benefits of GLP1-ra in addition to glycemic control include cardioprotection, renal protection and weight loss.   - Reviewed Ozempic titration schedule, starting with 0.25 mg once weekly to a goal of 2 mg once weekly if tolerated.   Labs: ordered, needs completed  Follow-up: I recommend diabetes  care be 1 month 9/19/24 @ 1130.  PCP Follow-Up: 10/31/24    Sweta Quintero, PharmD  Clinical Pharmacy Specialist    Continue all meds under the continuation of care with the referring provider and clinical pharmacy team.

## 2024-08-22 ENCOUNTER — APPOINTMENT (OUTPATIENT)
Dept: PHARMACY | Facility: HOSPITAL | Age: 69
End: 2024-08-22
Payer: MEDICARE

## 2024-08-22 DIAGNOSIS — E11.9 TYPE 2 DIABETES MELLITUS WITHOUT COMPLICATION, WITHOUT LONG-TERM CURRENT USE OF INSULIN (MULTI): ICD-10-CM

## 2024-09-14 DIAGNOSIS — E11.9 TYPE 2 DIABETES MELLITUS WITHOUT COMPLICATION, WITHOUT LONG-TERM CURRENT USE OF INSULIN (MULTI): ICD-10-CM

## 2024-09-16 RX ORDER — SEMAGLUTIDE 0.68 MG/ML
0.25 INJECTION, SOLUTION SUBCUTANEOUS
Qty: 2 ML | Refills: 3 | Status: SHIPPED | OUTPATIENT
Start: 2024-09-22

## 2024-09-19 ENCOUNTER — APPOINTMENT (OUTPATIENT)
Dept: PHARMACY | Facility: HOSPITAL | Age: 69
End: 2024-09-19
Payer: MEDICARE

## 2024-10-02 ENCOUNTER — APPOINTMENT (OUTPATIENT)
Dept: RADIOLOGY | Facility: CLINIC | Age: 69
End: 2024-10-02
Payer: MEDICARE

## 2024-10-03 ENCOUNTER — APPOINTMENT (OUTPATIENT)
Dept: PHARMACY | Facility: HOSPITAL | Age: 69
End: 2024-10-03
Payer: MEDICARE

## 2024-10-03 DIAGNOSIS — E11.9 TYPE 2 DIABETES MELLITUS WITHOUT COMPLICATION, WITHOUT LONG-TERM CURRENT USE OF INSULIN (MULTI): ICD-10-CM

## 2024-10-03 NOTE — PROGRESS NOTES
Clinical Pharmacist Visit    Patient is sent at the request of Arpan Gillespie MD for my opinion regarding Type 2 diabetes.  My final recommendations will be communicated back to the requesting provider by way of shared medical record.    Subjective     Patient referred for T2DM management. She was recently started on Ozempic with dose titration, tolerating well with occasional constipation.  Her current HgA1c is below goal.       Past Medical History:  She has a past medical history of Elevated blood-pressure reading, without diagnosis of hypertension, Other conditions influencing health status, Personal history of other diseases of the circulatory system, and Personal history of other diseases of the musculoskeletal system and connective tissue.    Past Surgical History:  She has a past surgical history that includes Hysterectomy (06/30/2014); Foot surgery (06/30/2014); Hand tendon surgery (06/30/2014); Knee surgery (06/30/2014); and Colonoscopy (01/2021).    Social History:  She reports that she quit smoking about 41 years ago. Her smoking use included cigarettes. She started smoking about 58 years ago. She has a 4.3 pack-year smoking history. She has never used smokeless tobacco. She reports that she does not currently use alcohol. She reports that she does not use drugs.    Family History:  Family History   Problem Relation Name Age of Onset    Aneurysm Mother      Heart failure Father      Kidney disease Brother      Hypertension Other      Diabetes Other         Allergies:  Patient has no known allergies.    Current diet:  eating better and smaller portions since starting ozempic, less cravings  Current exercise: walking    The patient does not have a known family history of diabetes.    Hypoglycemia frequency: never  Hypoglycemia awareness: Yes     Adverse Effects:   Constipation - recommended miralax PRN   Nausea - recommended taking at bedtime    Objective     Last Recorded Vitals:  BP Readings from  "Last 6 Encounters:   07/25/24 126/78   02/28/24 120/80   11/30/23 124/80   11/09/23 122/78   10/03/23 116/74   07/27/23 114/76        Wt Readings from Last 6 Encounters:   07/25/24 101 kg (222 lb 9.6 oz)   02/28/24 103 kg (226 lb)   11/30/23 104 kg (230 lb)   11/09/23 105 kg (231 lb)   10/03/23 104 kg (230 lb)   07/27/23 104 kg (229 lb)       Diabetes Pharmacotherapy:  Current medications:  - Ozempic 0.5 mg weekly  - Metformin  mg BID - only takes it once a day due to diarrhea     Previous medications:   - none     [facility] reporting FBG 94, 97 and she has not had FBG <100 historically   Denies any hypoglycemia     Weight loss:   Baseline weight: 224  Current weight: 210    Primary/Secondary Prevention   - Statin? Yes  - ACE-I/ARB? Yes  - Aspirin? No - upsets her stomach     Pertinent PMH Review:  - PMH of Pancreatitis: No  - PMH of Retinopathy: No  - PMH of Urinary Tract Infections: No  - PMH of MTC: No    Lab Review  Lab Results   Component Value Date    BILITOT 0.4 04/25/2023    CALCIUM 10.1 11/13/2023    CO2 28 11/13/2023     11/13/2023    CREATININE 1.09 (H) 11/13/2023    GLUCOSE 131 (H) 11/13/2023    ALKPHOS 120 04/25/2023    K 3.7 11/13/2023    PROT 7.8 04/25/2023     11/13/2023    AST 19 04/25/2023    ALT 13 04/25/2023    BUN 17 11/13/2023    ANIONGAP 15 11/13/2023    ALBUMIN 4.4 04/25/2023    GFRF 56 (A) 07/27/2023     Lab Results   Component Value Date    TRIG 119 07/27/2023    CHOL 115 07/27/2023    HDL 35.8 (A) 07/27/2023     Lab Results   Component Value Date    HGBA1C 6.8 (A) 07/25/2024    HGBA1C 6.9 (A) 02/28/2024    HGBA1C 6.5 (H) 11/13/2023     No components found for: \"UACR\"  The ASCVD Risk score (Zenobia BECERRA, et al., 2019) failed to calculate for the following reasons:    The valid total cholesterol range is 130 to 320 mg/dL    Unable to determine if patient is Non-     Health Maintenance:   Foot Exam: checks daily  Eye Exam: 1 year ago - due   Lipid Panel: " ordered - needs completed   Urine Albumin: ordered - needs completed   Influenza Immunization: refuses   Pneumonia Immunization: refuses    Drug Interactions:  None requiring intervention     Assessment/Plan   Problem List Items Addressed This Visit       Type 2 diabetes mellitus without complication, without long-term current use of insulin (Multi)    Relevant Medications    semaglutide (OZEMPIC) 1 mg/dose (4 mg/3 mL) pen injector    Other Relevant Orders    Follow Up In [facility] - Pharmacy     Patient has been experiencing some nausea and constipation since starting Ozempic. She said it is improving. She has been eating more salads and drinking more water which has helped with the constipation. She is agreeable to continue dose titration of Ozempic with the goal to stop metformin in the future because it dose cause her some diarrhea. She Is seeing additional weight loss but reporting appetite suppression has lessened.     Patients diabetes is stable with most recent A1c of 6.8% on 7/25/24 (Goal < 7%).   Increase: Ozempic to 1 mg weekly after finish next two doses of 0.5 mg weekly  Continue: Metformin  mg BID  Compliance at present is estimated to be excellent. Efforts to improve compliance (if necessary) will be directed at increased exercise.  Education Provided to Patient:  Ozempic  Education:  - Counseled patient on Ozempic MOA, expectations, administration, and monitoring parameters.   - Counseled patient that Ozempic may cause some GI adverse effects (upset stomach/diarrhea/constipation/nausea/vomiting) when starting out and ways to mitigate include eating smaller meals and limiting greasy or deep fried foods as this increases risk of nausea.   - Advised patient that they may experience improved satiety after meals and portion sizes of meals may be reduced as doses of Ozempic increase.   - Benefits of GLP1-ra in addition to glycemic control include cardioprotection, renal protection and  weight loss.   - Reviewed Ozempic titration schedule, starting with 0.25 mg once weekly to a goal of 2 mg once weekly if tolerated.   Labs: ordered, needs completed  Follow-up: I recommend diabetes care be 1 month 11/8/24 @ 9 am   PCP Follow-Up: 10/31/24    Jah CastilloD  Clinical Pharmacy Specialist    Continue all meds under the continuation of care with the referring provider and clinical pharmacy team.

## 2024-10-09 ENCOUNTER — APPOINTMENT (OUTPATIENT)
Dept: UROLOGY | Facility: HOSPITAL | Age: 69
End: 2024-10-09
Payer: MEDICARE

## 2024-10-21 DIAGNOSIS — E03.9 HYPOTHYROIDISM, UNSPECIFIED: ICD-10-CM

## 2024-10-21 RX ORDER — LEVOTHYROXINE SODIUM 75 UG/1
75 TABLET ORAL DAILY
Qty: 90 TABLET | Refills: 3 | Status: SHIPPED | OUTPATIENT
Start: 2024-10-21

## 2024-10-25 ENCOUNTER — LAB (OUTPATIENT)
Dept: LAB | Facility: LAB | Age: 69
End: 2024-10-25
Payer: MEDICARE

## 2024-10-25 DIAGNOSIS — D64.9 ANEMIA, UNSPECIFIED TYPE: ICD-10-CM

## 2024-10-25 DIAGNOSIS — E55.9 VITAMIN D DEFICIENCY: ICD-10-CM

## 2024-10-25 DIAGNOSIS — I10 BENIGN ESSENTIAL HYPERTENSION: ICD-10-CM

## 2024-10-25 DIAGNOSIS — R53.81 MALAISE: ICD-10-CM

## 2024-10-25 DIAGNOSIS — E78.2 HYPERLIPEMIA, MIXED: ICD-10-CM

## 2024-10-25 LAB
25(OH)D3 SERPL-MCNC: 25 NG/ML (ref 30–100)
ALBUMIN SERPL BCP-MCNC: 4.4 G/DL (ref 3.4–5)
ALP SERPL-CCNC: 95 U/L (ref 33–136)
ALT SERPL W P-5'-P-CCNC: 9 U/L (ref 7–45)
ANION GAP SERPL CALC-SCNC: 15 MMOL/L (ref 10–20)
APPEARANCE UR: ABNORMAL
AST SERPL W P-5'-P-CCNC: 15 U/L (ref 9–39)
BASOPHILS # BLD AUTO: 0.05 X10*3/UL (ref 0–0.1)
BASOPHILS NFR BLD AUTO: 0.6 %
BILIRUB SERPL-MCNC: 0.5 MG/DL (ref 0–1.2)
BILIRUB UR STRIP.AUTO-MCNC: NEGATIVE MG/DL
BUN SERPL-MCNC: 16 MG/DL (ref 6–23)
CALCIUM SERPL-MCNC: 10 MG/DL (ref 8.6–10.6)
CHLORIDE SERPL-SCNC: 102 MMOL/L (ref 98–107)
CHOLEST SERPL-MCNC: 77 MG/DL (ref 0–199)
CHOLESTEROL/HDL RATIO: 2.3
CO2 SERPL-SCNC: 29 MMOL/L (ref 21–32)
COLOR UR: YELLOW
CREAT SERPL-MCNC: 1.09 MG/DL (ref 0.5–1.05)
CREAT UR-MCNC: 270.6 MG/DL (ref 20–320)
EGFRCR SERPLBLD CKD-EPI 2021: 54 ML/MIN/1.73M*2
EOSINOPHIL # BLD AUTO: 0.06 X10*3/UL (ref 0–0.4)
EOSINOPHIL NFR BLD AUTO: 0.7 %
ERYTHROCYTE [DISTWIDTH] IN BLOOD BY AUTOMATED COUNT: 15.5 % (ref 11.5–14.5)
GLUCOSE SERPL-MCNC: 91 MG/DL (ref 74–99)
GLUCOSE UR STRIP.AUTO-MCNC: NORMAL MG/DL
HCT VFR BLD AUTO: 39.1 % (ref 36–46)
HDLC SERPL-MCNC: 33.2 MG/DL
HGB BLD-MCNC: 11.8 G/DL (ref 12–16)
IMM GRANULOCYTES # BLD AUTO: 0.02 X10*3/UL (ref 0–0.5)
IMM GRANULOCYTES NFR BLD AUTO: 0.2 % (ref 0–0.9)
KETONES UR STRIP.AUTO-MCNC: NEGATIVE MG/DL
LDLC SERPL CALC-MCNC: 19 MG/DL
LEUKOCYTE ESTERASE UR QL STRIP.AUTO: ABNORMAL
LYMPHOCYTES # BLD AUTO: 3.38 X10*3/UL (ref 0.8–3)
LYMPHOCYTES NFR BLD AUTO: 40.4 %
MCH RBC QN AUTO: 26.3 PG (ref 26–34)
MCHC RBC AUTO-ENTMCNC: 30.2 G/DL (ref 32–36)
MCV RBC AUTO: 87 FL (ref 80–100)
MICROALBUMIN UR-MCNC: 34.8 MG/L
MICROALBUMIN/CREAT UR: 12.9 UG/MG CREAT
MONOCYTES # BLD AUTO: 0.55 X10*3/UL (ref 0.05–0.8)
MONOCYTES NFR BLD AUTO: 6.6 %
NEUTROPHILS # BLD AUTO: 4.3 X10*3/UL (ref 1.6–5.5)
NEUTROPHILS NFR BLD AUTO: 51.5 %
NITRITE UR QL STRIP.AUTO: NEGATIVE
NON HDL CHOLESTEROL: 44 MG/DL (ref 0–149)
NRBC BLD-RTO: 0 /100 WBCS (ref 0–0)
PH UR STRIP.AUTO: 5.5 [PH]
PLATELET # BLD AUTO: 323 X10*3/UL (ref 150–450)
POTASSIUM SERPL-SCNC: 3.8 MMOL/L (ref 3.5–5.3)
PROT SERPL-MCNC: 7.3 G/DL (ref 6.4–8.2)
PROT UR STRIP.AUTO-MCNC: ABNORMAL MG/DL
RBC # BLD AUTO: 4.49 X10*6/UL (ref 4–5.2)
RBC # UR STRIP.AUTO: ABNORMAL /UL
RBC #/AREA URNS AUTO: NORMAL /HPF
SODIUM SERPL-SCNC: 142 MMOL/L (ref 136–145)
SP GR UR STRIP.AUTO: 1.02
TRIGL SERPL-MCNC: 126 MG/DL (ref 0–149)
TSH SERPL-ACNC: 0.74 MIU/L (ref 0.44–3.98)
URATE SERPL-MCNC: 5.2 MG/DL (ref 2.3–6.7)
UROBILINOGEN UR STRIP.AUTO-MCNC: NORMAL MG/DL
VLDL: 25 MG/DL (ref 0–40)
WBC # BLD AUTO: 8.4 X10*3/UL (ref 4.4–11.3)
WBC #/AREA URNS AUTO: NORMAL /HPF

## 2024-10-25 PROCEDURE — 83550 IRON BINDING TEST: CPT

## 2024-10-25 PROCEDURE — 82306 VITAMIN D 25 HYDROXY: CPT

## 2024-10-25 PROCEDURE — 82746 ASSAY OF FOLIC ACID SERUM: CPT

## 2024-10-25 PROCEDURE — 84443 ASSAY THYROID STIM HORMONE: CPT

## 2024-10-25 PROCEDURE — 85025 COMPLETE CBC W/AUTO DIFF WBC: CPT

## 2024-10-25 PROCEDURE — 82728 ASSAY OF FERRITIN: CPT

## 2024-10-25 PROCEDURE — 36415 COLL VENOUS BLD VENIPUNCTURE: CPT

## 2024-10-25 PROCEDURE — 83540 ASSAY OF IRON: CPT

## 2024-10-25 PROCEDURE — 82570 ASSAY OF URINE CREATININE: CPT

## 2024-10-25 PROCEDURE — 80053 COMPREHEN METABOLIC PANEL: CPT

## 2024-10-25 PROCEDURE — 84550 ASSAY OF BLOOD/URIC ACID: CPT

## 2024-10-25 PROCEDURE — 82607 VITAMIN B-12: CPT

## 2024-10-25 PROCEDURE — 82043 UR ALBUMIN QUANTITATIVE: CPT

## 2024-10-25 PROCEDURE — 80061 LIPID PANEL: CPT

## 2024-10-25 PROCEDURE — 81001 URINALYSIS AUTO W/SCOPE: CPT

## 2024-10-26 DIAGNOSIS — D64.9 ANEMIA, UNSPECIFIED TYPE: Primary | ICD-10-CM

## 2024-10-26 LAB
FERRITIN SERPL-MCNC: 194 NG/ML (ref 8–150)
FOLATE SERPL-MCNC: 5.7 NG/ML
IRON SATN MFR SERPL: 15 % (ref 25–45)
IRON SERPL-MCNC: 44 UG/DL (ref 35–150)
TIBC SERPL-MCNC: 292 UG/DL (ref 240–445)
UIBC SERPL-MCNC: 248 UG/DL (ref 110–370)
VIT B12 SERPL-MCNC: 598 PG/ML (ref 211–911)

## 2024-10-31 ENCOUNTER — APPOINTMENT (OUTPATIENT)
Dept: PRIMARY CARE | Facility: CLINIC | Age: 69
End: 2024-10-31
Payer: MEDICARE

## 2024-10-31 VITALS
BODY MASS INDEX: 38.73 KG/M2 | WEIGHT: 205 LBS | HEART RATE: 72 BPM | DIASTOLIC BLOOD PRESSURE: 74 MMHG | SYSTOLIC BLOOD PRESSURE: 110 MMHG

## 2024-10-31 DIAGNOSIS — R31.9 HEMATURIA, UNSPECIFIED TYPE: ICD-10-CM

## 2024-10-31 DIAGNOSIS — N18.31 CHRONIC KIDNEY DISEASE, STAGE 3A (MULTI): ICD-10-CM

## 2024-10-31 DIAGNOSIS — I10 BENIGN ESSENTIAL HYPERTENSION: Primary | ICD-10-CM

## 2024-10-31 DIAGNOSIS — E03.9 HYPOTHYROIDISM, UNSPECIFIED TYPE: ICD-10-CM

## 2024-10-31 DIAGNOSIS — E66.01 OBESITY, MORBID (MULTI): ICD-10-CM

## 2024-10-31 DIAGNOSIS — E11.22 TYPE 2 DIABETES MELLITUS WITH CHRONIC KIDNEY DISEASE, WITH LONG-TERM CURRENT USE OF INSULIN, UNSPECIFIED CKD STAGE (MULTI): ICD-10-CM

## 2024-10-31 DIAGNOSIS — Z79.4 TYPE 2 DIABETES MELLITUS WITH CHRONIC KIDNEY DISEASE, WITH LONG-TERM CURRENT USE OF INSULIN, UNSPECIFIED CKD STAGE (MULTI): ICD-10-CM

## 2024-10-31 DIAGNOSIS — E78.2 HYPERLIPEMIA, MIXED: ICD-10-CM

## 2024-10-31 LAB — POC HEMOGLOBIN A1C: 5.5 % (ref 4.2–6.5)

## 2024-10-31 PROCEDURE — G2211 COMPLEX E/M VISIT ADD ON: HCPCS | Performed by: INTERNAL MEDICINE

## 2024-10-31 PROCEDURE — 3074F SYST BP LT 130 MM HG: CPT | Performed by: INTERNAL MEDICINE

## 2024-10-31 PROCEDURE — 3060F POS MICROALBUMINURIA REV: CPT | Performed by: INTERNAL MEDICINE

## 2024-10-31 PROCEDURE — 1160F RVW MEDS BY RX/DR IN RCRD: CPT | Performed by: INTERNAL MEDICINE

## 2024-10-31 PROCEDURE — 3078F DIAST BP <80 MM HG: CPT | Performed by: INTERNAL MEDICINE

## 2024-10-31 PROCEDURE — 99213 OFFICE O/P EST LOW 20 MIN: CPT | Performed by: INTERNAL MEDICINE

## 2024-10-31 PROCEDURE — 3048F LDL-C <100 MG/DL: CPT | Performed by: INTERNAL MEDICINE

## 2024-10-31 PROCEDURE — 1159F MED LIST DOCD IN RCRD: CPT | Performed by: INTERNAL MEDICINE

## 2024-10-31 PROCEDURE — 1126F AMNT PAIN NOTED NONE PRSNT: CPT | Performed by: INTERNAL MEDICINE

## 2024-10-31 PROCEDURE — 83036 HEMOGLOBIN GLYCOSYLATED A1C: CPT | Performed by: INTERNAL MEDICINE

## 2024-10-31 ASSESSMENT — ENCOUNTER SYMPTOMS
APPETITE CHANGE: 0
SHORTNESS OF BREATH: 0
COUGH: 0
BRUISES/BLEEDS EASILY: 0
CONSTIPATION: 1
FREQUENCY: 0
WOUND: 0
BLOOD IN STOOL: 0
VOMITING: 0
COLOR CHANGE: 0
EYE PAIN: 0
WHEEZING: 0
NUMBNESS: 0
NECK PAIN: 0
FLANK PAIN: 0
DIARRHEA: 0
RHINORRHEA: 0
SPEECH DIFFICULTY: 0
EYE ITCHING: 0
MYALGIAS: 0
NAUSEA: 1
DIFFICULTY URINATING: 0
CHEST TIGHTNESS: 0
POLYPHAGIA: 0
PALPITATIONS: 0
SINUS PAIN: 0
EYE DISCHARGE: 0
POLYDIPSIA: 0
SORE THROAT: 0
HEMATURIA: 0
TROUBLE SWALLOWING: 0
CHILLS: 0
SLEEP DISTURBANCE: 0
JOINT SWELLING: 0
ABDOMINAL DISTENTION: 0
BACK PAIN: 0
LIGHT-HEADEDNESS: 0
NECK STIFFNESS: 0
CHOKING: 0
HEADACHES: 0
DIZZINESS: 0
WEAKNESS: 0
RECTAL PAIN: 0
SINUS PRESSURE: 0
FACIAL ASYMMETRY: 0
EYE REDNESS: 0
FATIGUE: 0
ACTIVITY CHANGE: 0
ADENOPATHY: 0
TREMORS: 0
ABDOMINAL PAIN: 0
DYSURIA: 0
PHOTOPHOBIA: 0
FACIAL SWELLING: 0
STRIDOR: 0
ARTHRALGIAS: 0
SEIZURES: 0
DIAPHORESIS: 0
ANAL BLEEDING: 0
VOICE CHANGE: 0

## 2024-10-31 ASSESSMENT — PAIN SCALES - GENERAL: PAINLEVEL_OUTOF10: 0-NO PAIN

## 2024-11-01 ENCOUNTER — APPOINTMENT (OUTPATIENT)
Dept: UROLOGY | Facility: CLINIC | Age: 69
End: 2024-11-01
Payer: MEDICARE

## 2024-11-08 ENCOUNTER — APPOINTMENT (OUTPATIENT)
Dept: PHARMACY | Facility: HOSPITAL | Age: 69
End: 2024-11-08
Payer: MEDICARE

## 2024-11-08 DIAGNOSIS — E11.9 TYPE 2 DIABETES MELLITUS WITHOUT COMPLICATION, WITHOUT LONG-TERM CURRENT USE OF INSULIN (MULTI): Primary | ICD-10-CM

## 2024-11-08 ASSESSMENT — ENCOUNTER SYMPTOMS
DIZZINESS: 1
FATIGUE: 0
WEIGHT LOSS: 0
BLURRED VISION: 0
POLYDIPSIA: 0
POLYPHAGIA: 0
WEAKNESS: 0
VISUAL CHANGE: 0

## 2024-11-08 NOTE — PROGRESS NOTES
Clinical Pharmacist Visit    Patient is sent at the request of Arpan Gillespie MD for my opinion regarding Type 2 diabetes.  My final recommendations will be communicated back to the requesting provider by way of shared medical record.    Last PCP visit: 10/31/24  Positive for constipation and nausea   Benign essential hypertension-low-salt diet and exercise  Type 2 diabetes mellitus with chronic kidney disease, with long-term current use of insulin, unspecified CKD stage (Multi)-hemoglobin A1c was 5.5.  Will continue with present management.  Ophthalmology appointment has been done..-Will continue with statin  POCT glycosylated hemoglobin (Hb A1C) manually resulted  Hyperlipemia, mixed-we will continue with statin  Hypothyroidism, unspecified type-we will continue with Synthroid  Obesity, morbid (Multi)-diet and exercise  Chronic kidney disease, stage 3a (Multi)-stable  Hematuria, unspecified type-follow-up with urology    Last Pharmacy Visit: 10/3/24  Patient has been experiencing some nausea and constipation since starting Ozempic. She said it is improving. She has been eating more salads and drinking more water which has helped with the constipation. She is agreeable to continue dose titration of Ozempic with the goal to stop metformin in the future because it dose cause her some diarrhea. She Is seeing additional weight loss but reporting appetite suppression has lessened.   Patients diabetes is stable with most recent A1c of 6.8% on 7/25/24 (Goal < 7%).   Increase: Ozempic to 1 mg weekly after finish next two doses of 0.5 mg weekly  Continue: Metformin  mg BID  Compliance at present is estimated to be excellent. Efforts to improve compliance (if necessary) will be directed at increased exercise.    Subjective     Patient referred for T2DM management. She was recently started on Ozempic with dose titration, tolerating well with occasional constipation.  Her current HgA1c is at goal.     Diabetes  She  presents for her follow-up diabetic visit. She has type 2 diabetes mellitus. The initial diagnosis of diabetes was made 10 years ago. Her disease course has been improving. Hypoglycemia symptoms include dizziness. (Reports missing a meal ) Pertinent negatives for diabetes include no blurred vision, no chest pain, no fatigue, no foot paresthesias, no foot ulcerations, no polydipsia, no polyphagia, no polyuria, no visual change, no weakness and no weight loss. Symptoms are improving. She is compliant with treatment all of the time. An ACE inhibitor/angiotensin II receptor blocker is being taken. Eye exam is current.     Past Medical History:  She has a past medical history of Elevated blood-pressure reading, without diagnosis of hypertension, Other conditions influencing health status, Personal history of other diseases of the circulatory system, and Personal history of other diseases of the musculoskeletal system and connective tissue.    Past Surgical History:  She has a past surgical history that includes Hysterectomy (06/30/2014); Foot surgery (06/30/2014); Hand tendon surgery (06/30/2014); Knee surgery (06/30/2014); and Colonoscopy (01/2021).    Social History:  She reports that she quit smoking about 41 years ago. Her smoking use included cigarettes. She started smoking about 58 years ago. She has a 4.3 pack-year smoking history. She has never used smokeless tobacco. She reports that she does not currently use alcohol. She reports that she does not use drugs.    Family History:  Family History   Problem Relation Name Age of Onset    Aneurysm Mother      Heart failure Father      Kidney disease Brother      Hypertension Other      Diabetes Other       Allergies:  Patient has no known allergies.    Current diet:  eating better and smaller portions since starting ozempic, less cravings and endorses improved appetite   Current exercise: walking    The patient does not have a known family history of  diabetes.    Diabetes Pharmacotherapy:    Current medications:  - Ozempic 1 mg - takes on Sundays and has only taken 1 dose  - Metformin  mg once daily - only takes it once a day due to diarrhea     Adverse Effects:   Constipation - recommended miralax PRN   Nausea - recommended taking at bedtime  Heartburn - only with increased dose     Previous medications:   - Ozempic 0.5 mg weekly x 2 months - previously x 4 weeks of 0.25 mg   - Metformin  mg BID     SMBG reporting - none   - Previous appt: FBG 94, 97 and she has not had FBG <100 historically     Denies any hypoglycemia   Patient states she experienced dizziness after taking her first 1 mg dose this past Sunday but does admit skipping 1-2 meals but did not take BG at thus time  She states she ate 2 pieces of toast and symptoms resolved    Weight loss:   Baseline weight: 224  Current weight: 205  Last Appt: 210    Primary/Secondary Prevention   - Statin? Yes  - ACE-I/ARB? Yes  - Aspirin? No - upsets her stomach     Pertinent PMH Review:  - PMH of Pancreatitis: No  - PMH of Retinopathy: No  - PMH of Urinary Tract Infections: No  - PMH of MTC: No    Objective     Last Recorded Vitals:  BP Readings from Last 6 Encounters:   10/31/24 110/74   07/25/24 126/78   02/28/24 120/80   11/30/23 124/80   11/09/23 122/78   10/03/23 116/74        Wt Readings from Last 6 Encounters:   10/31/24 93 kg (205 lb)   07/25/24 101 kg (222 lb 9.6 oz)   02/28/24 103 kg (226 lb)   11/30/23 104 kg (230 lb)   11/09/23 105 kg (231 lb)   10/03/23 104 kg (230 lb)     Lab Review  Lab Results   Component Value Date    BILITOT 0.5 10/25/2024    CALCIUM 10.0 10/25/2024    CO2 29 10/25/2024     10/25/2024    CREATININE 1.09 (H) 10/25/2024    GLUCOSE 91 10/25/2024    ALKPHOS 95 10/25/2024    K 3.8 10/25/2024    PROT 7.3 10/25/2024     10/25/2024    AST 15 10/25/2024    ALT 9 10/25/2024    BUN 16 10/25/2024    ANIONGAP 15 10/25/2024    ALBUMIN 4.4 10/25/2024    GFRF 56 (A)  "07/27/2023     Lab Results   Component Value Date    TRIG 126 10/25/2024    CHOL 77 10/25/2024    LDLCALC 19 10/25/2024    HDL 33.2 10/25/2024     Lab Results   Component Value Date    HGBA1C 5.5 10/31/2024    HGBA1C 6.8 (A) 07/25/2024    HGBA1C 6.9 (A) 02/28/2024     No components found for: \"UACR\"  The ASCVD Risk score (Zenobia BECERRA, et al., 2019) failed to calculate for the following reasons:    The valid total cholesterol range is 130 to 320 mg/dL    Unable to determine if patient is Non-     Health Maintenance:   Foot Exam: checks occasionally - due and educated patient on importance  Eye Exam: Completed  Lipid Panel: Completed   Urine Albumin: Completed   Influenza Immunization: refuses   Pneumonia Immunization: refuses    Drug Interactions:  - Allopurinol has a lower starting dose with GFR 30-60 (patient has 54): patient has been on this dose for ~2 years so it is safe to continue therapy, but will need to monitor for any potential toxicity that may occur (rash, pruritus, elevated LFTs, N/D/V, etc)   - To note patient has not had a gout flare in a year and states her current regimen has been integral in controlling her gout flares as they are very painful    Assessment/Plan   Problem List Items Addressed This Visit       Type 2 diabetes mellitus without complication, without long-term current use of insulin (Multi) - Primary     Patient has been experiencing some nausea and constipation since starting Ozempic. She said it improves over time, but of note she has only taken 1 dose of the Semaglutide and these symptoms have worsened but the patient is confident it will improve and is agreeable to continue on the 1 mg dose. Patient has noted trends with GI symptoms depending on her diet and when she does eat spicy foods she uses famotidine and this relieves her symptoms. She Is seeing additional weight loss but reporting appetite suppression has lessened with the 1 mg dose.     Patients " diabetes is well controlled and improved with most recent A1c of 5.5 on 10/31/24. Last was 6.8% on 7/25/24 (Goal < 7%).   Continue: Ozempic to 1 mg weekly for at least 3 more doses (4 doses min total)  STOP: Metformin  Patient has only been taken 1 tablet the majority of the time she has been prescribed BID.   In addition patient has labile GI symptoms (sometimes constipation and sometimes diarrhea), experienced dizziness after taking her first 1 mg dose this past Sunday (patient does admit skipping 1-2 meals but did not take BG. She states she ate 2 pieces of toast and symptoms resolved), and has a improved A1c   Patient is agreeable to stop metformin  Compliance at present is estimated to be excellent. Efforts to improve compliance (if necessary) will be directed at increased exercise.  Education Provided to Patient:  Ozempic  Education:  - Counseled patient on Ozempic MOA, expectations, administration, and monitoring parameters.   - Counseled patient that Ozempic may cause some GI adverse effects (upset stomach/diarrhea/constipation/nausea/vomiting) when starting out and ways to mitigate include eating smaller meals and limiting greasy or deep fried foods as this increases risk of nausea.   - Advised patient that they may experience improved satiety after meals and portion sizes of meals may be reduced as doses of Ozempic increase.   - Benefits of GLP1-ra in addition to glycemic control include cardioprotection, renal protection and weight loss.   - Reviewed Ozempic titration schedule, starting with 0.25 mg once weekly to a goal of 2 mg once weekly if tolerated.     Follow-up: I recommend diabetes care be  3 weeks on  11/26/24 @ 9:40 am   PCP Follow-Up: 1/31/25    Mame Bruce, PharmGERALDINE   Clinical Pharmacist Specialist, Primary Care   Phone: [phone]   Fax: [phone]   Email: [email or web address]    Continue all meds under the continuation of care with the referring provider and clinical pharmacy  team.

## 2024-11-08 NOTE — ASSESSMENT & PLAN NOTE
Diabetes Pharmacotherapy:    Current medications:  - Ozempic 1 mg - takes on Sundays and has only taken 1 dose  - Metformin  mg once daily - only takes it once a day due to diarrhea     Adverse Effects:   Constipation - recommended miralax PRN   Nausea - recommended taking at bedtime  Heartburn - only with increased dose     Previous medications:   - Ozempic 0.5 mg weekly x 2 months - previously x 4 weeks of 0.25 mg   - Metformin  mg BID     SMBG reporting - none   - Previous appt: FBG 94, 97 and she has not had FBG <100 historically     Denies any hypoglycemia   Patient states she experienced dizziness after taking her first 1 mg dose this past Sunday but does admit skipping 1-2 meals but did not take BG at thus time  She states she ate 2 pieces of toast and symptoms resolved    Weight loss:   Baseline weight: 224  Current weight: 205  Last Appt: 210    Health Maintenance:   Foot Exam: checks daily  Eye Exam: 1 year ago - due and educated patient on importance  Lipid Panel: ordered - completed   Urine Albumin: ordered - completed   Influenza Immunization: refuses   Pneumonia Immunization: refuses    Drug Interactions:  - Allopurinol has a lower starting dose with GFR 30-60 (patient has 54): patient has been on this dose for ~2 years so it is safe to continue therapy, but will need to monitor for any potential toxicity that may occur (rash, pruritus, elevated LFTs, N/D/V, etc)    ealth Maintenance:   Foot Exam: checks occasionally - due and educated patient on importance  Eye Exam: Completed  Lipid Panel: Completed   Urine Albumin: Completed   Influenza Immunization: refuses   Pneumonia Immunization: refuses    Drug Interactions:  - Allopurinol has a lower starting dose with GFR 30-60 (patient has 54): patient has been on this dose for ~2 years so it is safe to continue therapy, but will need to monitor for any potential toxicity that may occur (rash, pruritus, elevated LFTs, N/D/V, etc)   - To note  patient has not had a gout flare in a year and states her current regimen has been integral in controlling her gout flares as they are very painful    Assessment/Plan   Problem List Items Addressed This Visit       Type 2 diabetes mellitus without complication, without long-term current use of insulin (Multi) - Primary     Patient has been experiencing some nausea and constipation since starting Ozempic. She said it improves over time, but of note she has only taken 1 dose of the metformin and these symptoms have worsened but is confident it will improve and is agreeable to continue on the 1 mg dose. Patient has noted trends with GI symptoms depending on her diet and when she does eat spicy foods she uses famotidine and this relieves her symptoms. She Is seeing additional weight loss but reporting appetite suppression has lessened with the 1 mg dose.     Patients diabetes is well controlled and improved with most recent A1c of 5.5 on 10/31/24. Last was 6.8% on 7/25/24 (Goal < 7%).   Continue: Ozempic to 1 mg weekly for at least 3 more doses (4 doses min total)  STOP: Metformin  Patient has only been taken 1 tablet the majority of the time she has been prescribed BID.   In addition patient has labile GI symptoms (sometimes constipation and sometimes diarrhea), experienced dizziness after taking her first 1 mg dose this past Sunday (patient does admit skipping 1-2 meals but did not take BG. She states she ate 2 pieces of toast and symptoms resolved), and has a improved A1c   Patient is agreeable to stop metformin  Compliance at present is estimated to be excellent. Efforts to improve compliance (if necessary) will be directed at increased exercise.  Education Provided to Patient:  Ozempic  Education:  - Counseled patient on Ozempic MOA, expectations, administration, and monitoring parameters.   - Counseled patient that Ozempic may cause some GI adverse effects (upset stomach/diarrhea/constipation/nausea/vomiting) when  starting out and ways to mitigate include eating smaller meals and limiting greasy or deep fried foods as this increases risk of nausea.   - Advised patient that they may experience improved satiety after meals and portion sizes of meals may be reduced as doses of Ozempic increase.   - Benefits of GLP1-ra in addition to glycemic control include cardioprotection, renal protection and weight loss.   - Reviewed Ozempic titration schedule, starting with 0.25 mg once weekly to a goal of 2 mg once weekly if tolerated.     Follow-up: I recommend diabetes care be 3 weeks on 11/26/24 @ 9:40 am   PCP Follow-Up: 1/31/25

## 2024-11-13 ENCOUNTER — HOSPITAL ENCOUNTER (OUTPATIENT)
Dept: RADIOLOGY | Facility: CLINIC | Age: 69
Discharge: HOME | End: 2024-11-13
Payer: MEDICARE

## 2024-11-13 VITALS — WEIGHT: 205 LBS | BODY MASS INDEX: 38.71 KG/M2 | HEIGHT: 61 IN

## 2024-11-13 DIAGNOSIS — Z12.31 SCREENING MAMMOGRAM FOR BREAST CANCER: ICD-10-CM

## 2024-11-13 PROCEDURE — 77067 SCR MAMMO BI INCL CAD: CPT | Performed by: RADIOLOGY

## 2024-11-13 PROCEDURE — 77067 SCR MAMMO BI INCL CAD: CPT

## 2024-11-13 PROCEDURE — 77063 BREAST TOMOSYNTHESIS BI: CPT | Performed by: RADIOLOGY

## 2024-11-21 ENCOUNTER — APPOINTMENT (OUTPATIENT)
Dept: UROLOGY | Facility: CLINIC | Age: 69
End: 2024-11-21
Payer: MEDICARE

## 2024-11-26 ENCOUNTER — APPOINTMENT (OUTPATIENT)
Dept: PHARMACY | Facility: HOSPITAL | Age: 69
End: 2024-11-26
Payer: MEDICARE

## 2024-11-26 DIAGNOSIS — Z79.4 TYPE 2 DIABETES MELLITUS WITH CHRONIC KIDNEY DISEASE, WITH LONG-TERM CURRENT USE OF INSULIN, UNSPECIFIED CKD STAGE (MULTI): Primary | ICD-10-CM

## 2024-11-26 DIAGNOSIS — E11.9 TYPE 2 DIABETES MELLITUS WITHOUT COMPLICATION, WITHOUT LONG-TERM CURRENT USE OF INSULIN (MULTI): ICD-10-CM

## 2024-11-26 DIAGNOSIS — E78.2 HYPERLIPEMIA, MIXED: ICD-10-CM

## 2024-11-26 DIAGNOSIS — E11.22 TYPE 2 DIABETES MELLITUS WITH CHRONIC KIDNEY DISEASE, WITH LONG-TERM CURRENT USE OF INSULIN, UNSPECIFIED CKD STAGE (MULTI): Primary | ICD-10-CM

## 2024-11-26 ASSESSMENT — ENCOUNTER SYMPTOMS
DIZZINESS: 1
WEIGHT LOSS: 0
VISUAL CHANGE: 0
POLYDIPSIA: 0
WEAKNESS: 0
POLYPHAGIA: 0
BLURRED VISION: 0
FATIGUE: 0

## 2024-11-26 NOTE — ASSESSMENT & PLAN NOTE
wander has been experiencing some nausea and constipation since starting Ozempic. We discussed continuing with the 1mg or increasing and the patient was most agreeable to continue on the 1 mg dose and wait to increase at the next appointment. Patient has noted trends with GI symptoms depending on her diet and when she does eat spicy foods she uses famotidine and this relieves her symptoms. She Is seeing additional weight loss but reporting appetite suppression has lessened with the 1 mg dose.     Patients diabetes is well controlled and improved with most recent A1c of 5.5 on 10/31/24. Last was 6.8% on 7/25/24 (Goal < 7%).   Continue: Ozempic to 1 mg weekly   Compliance at present is estimated to be excellent. Efforts to improve compliance (if necessary) will be directed at increased exercise.  Follow-up: I recommend diabetes care be 2 months on 1/21/25 @ 9:40 am   PCP Follow-Up: 1/31/25

## 2024-11-26 NOTE — PROGRESS NOTES
Clinical Pharmacist Visit    Patient is sent at the request of No ref. provider found for my opinion regarding Type 2 diabetes.  My final recommendations will be communicated back to the requesting provider by way of shared medical record.    Last PCP visit: 10/31/24  Positive for constipation and nausea   Benign essential hypertension-low-salt diet and exercise  Type 2 diabetes mellitus with chronic kidney disease, with long-term current use of insulin, unspecified CKD stage (Multi)-hemoglobin A1c was 5.5.  Will continue with present management.  Ophthalmology appointment has been done..-Will continue with statin  POCT glycosylated hemoglobin (Hb A1C) manually resulted  Hyperlipemia, mixed-we will continue with statin  Hypothyroidism, unspecified type-we will continue with Synthroid  Obesity, morbid (Multi)-diet and exercise  Chronic kidney disease, stage 3a (Multi)-stable  Hematuria, unspecified type-follow-up with urology    Last Pharmacy Visit: Stopped metformin and completed 4 total doses of ozempic 1 mg     Subjective     Patient referred for T2DM management. She was recently started on Ozempic with dose titration, tolerating well with occasional constipation.  Her current HgA1c is at goal.     Diabetes  She presents for her follow-up diabetic visit. She has type 2 diabetes mellitus. The initial diagnosis of diabetes was made 10 years ago. Her disease course has been improving. Hypoglycemia symptoms include dizziness. (Reports missing a meal ) Pertinent negatives for diabetes include no blurred vision, no chest pain, no fatigue, no foot paresthesias, no foot ulcerations, no polydipsia, no polyphagia, no polyuria, no visual change, no weakness and no weight loss. Symptoms are improving. She is compliant with treatment all of the time. An ACE inhibitor/angiotensin II receptor blocker is being taken. Eye exam is current.     Past Medical History:  She has a past medical history of Elevated blood-pressure reading,  without diagnosis of hypertension, Other conditions influencing health status, Personal history of other diseases of the circulatory system, and Personal history of other diseases of the musculoskeletal system and connective tissue.    Past Surgical History:  She has a past surgical history that includes Hysterectomy (06/30/2014); Foot surgery (06/30/2014); Hand tendon surgery (06/30/2014); Knee surgery (06/30/2014); and Colonoscopy (01/2021).    Social History:  She reports that she quit smoking about 41 years ago. Her smoking use included cigarettes. She started smoking about 58 years ago. She has a 4.3 pack-year smoking history. She has never used smokeless tobacco. She reports that she does not currently use alcohol. She reports that she does not use drugs.    Family History:  Family History   Problem Relation Name Age of Onset    Aneurysm Mother      Heart failure Father      Kidney disease Brother      Hypertension Other      Diabetes Other       Allergies:  Patient has no known allergies.    Current diet:  eating better and smaller portions since starting ozempic, less cravings and endorses improved appetite   Current exercise: walking    The patient does not have a known family history of diabetes.    Diabetes Pharmacotherapy:    Current medications:  - Ozempic 1 mg - takes on Sundays and has only taken 1 dose   - Now has more hunger     Adverse Effects:   Constipation - recommended miralax PRN     - no changes since last appointment, improved with dietary choices such as including more vegetables/fiber  Nausea - recommended taking at bedtime    - only if she is eating sugary candy, this is different from when she first started taking this and has subsided since then unless she has dietary indiscretions  Heartburn - only with increased dose   Belching - states this is new and has been fairly constant. Recommended to continue to use famotidine and trial simethicone or omeprazole otc if the other two bring no  relief    Previous medications:   - Ozempic 0.5 mg weekly x 2 months - previously x 4 weeks of 0.25 mg   - Metformin  mg BID     SMBG reporting - none   - Previous appt: FBG 94, 97 and she has not had FBG <100 historically     Denies any hypoglycemia     Weight loss:   Baseline weight: 224  Current weight: 201-203 lbs   Last Appt: 205    Primary/Secondary Prevention   - Statin? Yes  - ACE-I/ARB? Yes  - Aspirin? No - upsets her stomach     Pertinent PMH Review:  - PMH of Pancreatitis: No  - PMH of Retinopathy: No  - PMH of Urinary Tract Infections: No  - PMH of MTC: No    Objective     Last Recorded Vitals:  BP Readings from Last 6 Encounters:   10/31/24 110/74   07/25/24 126/78   02/28/24 120/80   11/30/23 124/80   11/09/23 122/78   10/03/23 116/74        Wt Readings from Last 6 Encounters:   11/13/24 93 kg (205 lb)   10/31/24 93 kg (205 lb)   07/25/24 101 kg (222 lb 9.6 oz)   02/28/24 103 kg (226 lb)   11/30/23 104 kg (230 lb)   11/09/23 105 kg (231 lb)     Lab Review  Lab Results   Component Value Date    BILITOT 0.5 10/25/2024    CALCIUM 10.0 10/25/2024    CO2 29 10/25/2024     10/25/2024    CREATININE 1.09 (H) 10/25/2024    GLUCOSE 91 10/25/2024    ALKPHOS 95 10/25/2024    K 3.8 10/25/2024    PROT 7.3 10/25/2024     10/25/2024    AST 15 10/25/2024    ALT 9 10/25/2024    BUN 16 10/25/2024    ANIONGAP 15 10/25/2024    ALBUMIN 4.4 10/25/2024    GFRF 56 (A) 07/27/2023     Lab Results   Component Value Date    TRIG 126 10/25/2024    CHOL 77 10/25/2024    LDLCALC 19 10/25/2024    HDL 33.2 10/25/2024     Lab Results   Component Value Date    HGBA1C 5.5 10/31/2024    HGBA1C 6.8 (A) 07/25/2024    HGBA1C 6.9 (A) 02/28/2024     Health Maintenance:   Foot Exam: checks occasionally - due and educated patient on importance  Eye Exam: Completed  Lipid Panel: Completed   Urine Albumin: Completed   Influenza Immunization: refuses   Pneumonia Immunization: refuses    Drug Interactions:  - Allopurinol has a lower  starting dose with GFR 30-60 (patient has 54): patient has been on this dose for ~2 years so it is safe to continue therapy, but will need to monitor for any potential toxicity that may occur (rash, pruritus, elevated LFTs, N/D/V, etc)   - To note patient has not had a gout flare in a year and states her current regimen has been integral in controlling her gout flares as they are very painful    Assessment/Plan   Problem List Items Addressed This Visit       Type 2 diabetes mellitus without complication, without long-term current use of insulin (Multi)    Relevant Medications    semaglutide (OZEMPIC) 1 mg/dose (4 mg/3 mL) pen injector    Type 2 diabetes mellitus with chronic kidney disease, with long-term current use of insulin, unspecified CKD stage (Multi) - Primary    Relevant Medications    semaglutide (OZEMPIC) 1 mg/dose (4 mg/3 mL) pen injector    Hyperlipemia, mixed    Relevant Medications    semaglutide (OZEMPIC) 1 mg/dose (4 mg/3 mL) pen injector     Patient has been experiencing some nausea and constipation since starting Ozempic. We discussed continuing with the 1mg or increasing and the patient was most agreeable to continue on the 1 mg dose and wait to increase at the next appointment. Patient has noted trends with GI symptoms depending on her diet and when she does eat spicy foods she uses famotidine and this relieves her symptoms. She Is seeing additional weight loss but reporting appetite suppression has lessened with the 1 mg dose.     Patients diabetes is well controlled and improved with most recent A1c of 5.5 on 10/31/24. Last was 6.8% on 7/25/24 (Goal < 7%).   Continue: Ozempic to 1 mg weekly   Compliance at present is estimated to be excellent. Efforts to improve compliance (if necessary) will be directed at increased exercise.  Follow-up: I recommend diabetes care be  2 months on  1/21/25 @ 9:40 am   PCP Follow-Up: 1/31/25    Mame Bruce, Fide   Clinical Pharmacist Specialist, Primary Care    Phone: [phone]   Fax: [phone]   Email: [email or web address]    Continue all meds under the continuation of care with the referring provider and clinical pharmacy team.

## 2024-11-26 NOTE — PATIENT INSTRUCTIONS
Continue ozempic 1 mg once weekly for 2 months     Follow-up: I recommend diabetes care be 2 months on 1/21/25 @ 9:40 am   PCP Follow-Up: 1/31/25    Mame Bruce, PharmD   Clinical Pharmacist Specialist, Primary Care   Phone: [phone]   Fax: [phone]   Email: [email or web address]

## 2024-11-27 ENCOUNTER — HOSPITAL ENCOUNTER (OUTPATIENT)
Dept: RADIOLOGY | Facility: CLINIC | Age: 69
Discharge: HOME | End: 2024-11-27
Payer: MEDICARE

## 2024-11-27 DIAGNOSIS — N83.209 CYST OF OVARY, UNSPECIFIED LATERALITY: ICD-10-CM

## 2024-11-27 PROCEDURE — 76830 TRANSVAGINAL US NON-OB: CPT

## 2024-12-06 ENCOUNTER — APPOINTMENT (OUTPATIENT)
Dept: RADIOLOGY | Facility: HOSPITAL | Age: 69
End: 2024-12-06
Payer: MEDICARE

## 2025-01-02 DIAGNOSIS — E78.2 HYPERLIPEMIA, MIXED: ICD-10-CM

## 2025-01-02 RX ORDER — ROSUVASTATIN CALCIUM 5 MG/1
5 TABLET, COATED ORAL DAILY
Qty: 90 TABLET | Refills: 3 | Status: SHIPPED | OUTPATIENT
Start: 2025-01-02

## 2025-01-03 ENCOUNTER — APPOINTMENT (OUTPATIENT)
Dept: RADIOLOGY | Facility: HOSPITAL | Age: 70
End: 2025-01-03
Payer: MEDICARE

## 2025-01-21 ENCOUNTER — APPOINTMENT (OUTPATIENT)
Dept: PHARMACY | Facility: HOSPITAL | Age: 70
End: 2025-01-21
Payer: MEDICARE

## 2025-01-21 DIAGNOSIS — E11.9 TYPE 2 DIABETES MELLITUS WITHOUT COMPLICATION, WITHOUT LONG-TERM CURRENT USE OF INSULIN (MULTI): ICD-10-CM

## 2025-01-21 DIAGNOSIS — Z79.4 TYPE 2 DIABETES MELLITUS WITH CHRONIC KIDNEY DISEASE, WITH LONG-TERM CURRENT USE OF INSULIN, UNSPECIFIED CKD STAGE (MULTI): Primary | ICD-10-CM

## 2025-01-21 DIAGNOSIS — E11.22 TYPE 2 DIABETES MELLITUS WITH CHRONIC KIDNEY DISEASE, WITH LONG-TERM CURRENT USE OF INSULIN, UNSPECIFIED CKD STAGE (MULTI): Primary | ICD-10-CM

## 2025-01-21 DIAGNOSIS — I10 HYPERTENSION, UNSPECIFIED TYPE: ICD-10-CM

## 2025-01-21 DIAGNOSIS — E78.2 HYPERLIPEMIA, MIXED: ICD-10-CM

## 2025-01-21 ASSESSMENT — ENCOUNTER SYMPTOMS
POLYDIPSIA: 0
POLYPHAGIA: 0
WEAKNESS: 0
DIZZINESS: 1
WEIGHT LOSS: 0
VISUAL CHANGE: 0
BLURRED VISION: 0
FATIGUE: 0

## 2025-01-21 NOTE — PROGRESS NOTES
Clinical Pharmacist Visit    Patient is sent at the request of Dr. Gillespie for my opinion regarding Type 2 diabetes.  My final recommendations will be communicated back to the requesting provider by way of shared medical record.    Last PCP visit: 10/31/24  Positive for constipation and nausea   Benign essential hypertension-low-salt diet and exercise  Type 2 diabetes mellitus with chronic kidney disease, with long-term current use of insulin, unspecified CKD stage (Multi)-hemoglobin A1c was 5.5.  Will continue with present management.  Ophthalmology appointment has been done..-Will continue with statin  POCT glycosylated hemoglobin (Hb A1C) manually resulted  Hyperlipemia, mixed-we will continue with statin  Hypothyroidism, unspecified type-we will continue with Synthroid  Obesity, morbid (Multi)-diet and exercise  Chronic kidney disease, stage 3a (Multi)-stable  Hematuria, unspecified type-follow-up with urology    Last Pharmacy Visit: 11/26/25  Patient has been experiencing some nausea and constipation since starting Ozempic. We discussed continuing with the 1mg or increasing and the patient was most agreeable to continue on the 1 mg dose and wait to increase at the next appointment. Patient has noted trends with GI symptoms depending on her diet and when she does eat spicy foods she uses famotidine and this relieves her symptoms. She Is seeing additional weight loss but reporting appetite suppression has lessened with the 1 mg dose.     Subjective     Patient referred for T2DM management. She was recently started on Ozempic with dose titration, tolerating well with occasional constipation.  Her current HgA1c is at goal.     Diabetes  She presents for her follow-up diabetic visit. She has type 2 diabetes mellitus. The initial diagnosis of diabetes was made 10 years ago. Her disease course has been improving. Hypoglycemia symptoms include dizziness. (Reports missing a meal ) Pertinent negatives for diabetes include  no blurred vision, no chest pain, no fatigue, no foot paresthesias, no foot ulcerations, no polydipsia, no polyphagia, no polyuria, no visual change, no weakness and no weight loss. Symptoms are improving. She is compliant with treatment all of the time. An ACE inhibitor/angiotensin II receptor blocker is being taken. Eye exam is current.     Past Medical History:  She has a past medical history of Elevated blood-pressure reading, without diagnosis of hypertension, Other conditions influencing health status, Personal history of other diseases of the circulatory system, and Personal history of other diseases of the musculoskeletal system and connective tissue.    Past Surgical History:  She has a past surgical history that includes Hysterectomy (06/30/2014); Foot surgery (06/30/2014); Hand tendon surgery (06/30/2014); Knee surgery (06/30/2014); and Colonoscopy (01/2021).    Social History:  She reports that she quit smoking about 42 years ago. Her smoking use included cigarettes. She started smoking about 59 years ago. She has a 4.3 pack-year smoking history. She has never used smokeless tobacco. She reports that she does not currently use alcohol. She reports that she does not use drugs.    Family History:  Family History   Problem Relation Name Age of Onset    Aneurysm Mother      Heart failure Father      Kidney disease Brother      Hypertension Other      Diabetes Other       Allergies:  Patient has no known allergies.    Current diet:  eating better and smaller portions since starting ozempic, less cravings and endorses improved appetite   Current exercise: walking    The patient does not have a known family history of diabetes.    Diabetes Pharmacotherapy:    Current medications:  - Ozempic 1 mg - takes on Sundays    - Has been on this    - Now has more hunger     Adverse Effects: at todays appointment she denies adrs  Prior Appts  Constipation - recommended miralax PRN   improved with dietary choices such as  including more vegetables/fiber  Nausea - recommended taking at bedtime   only if she is eating sugary candy, this is different from when she first started taking this and has subsided since then unless she has dietary indiscretions  Heartburn - only with increased dose 1 mg   Belching - states this is new and has been fairly constant. Recommended to continue to use famotidine and trial simethicone or omeprazole otc if the other two bring no relief    Previous medications:   - Ozempic 0.5 mg weekly x 2 months - previously x 4 weeks of 0.25 mg   - Metformin  mg BID     SMBG reporting - none   - Previous appt: FBG 94, 97 and she has not had FBG <100 historically     Denies any hypoglycemia     Weight loss:   Baseline weight: 224  Current weight: 1/21/25: 298 lbs  11/26/24: 201-203 lbs  8/22/24: 205 lbs     Primary/Secondary Prevention   - Statin? Yes  - ACE-I/ARB? Yes  - Aspirin? No - upsets her stomach     Pertinent PMH Review:  - PMH of Pancreatitis: No  - PMH of Retinopathy: No  - PMH of Urinary Tract Infections: No  - PMH of MTC: No    Objective     Last Recorded Vitals:  BP Readings from Last 6 Encounters:   10/31/24 110/74   07/25/24 126/78   02/28/24 120/80   11/30/23 124/80   11/09/23 122/78   10/03/23 116/74        Wt Readings from Last 6 Encounters:   11/13/24 93 kg (205 lb)   10/31/24 93 kg (205 lb)   07/25/24 101 kg (222 lb 9.6 oz)   02/28/24 103 kg (226 lb)   11/30/23 104 kg (230 lb)   11/09/23 105 kg (231 lb)     Lab Review  Lab Results   Component Value Date    BILITOT 0.5 10/25/2024    CALCIUM 10.0 10/25/2024    CO2 29 10/25/2024     10/25/2024    CREATININE 1.09 (H) 10/25/2024    GLUCOSE 91 10/25/2024    ALKPHOS 95 10/25/2024    K 3.8 10/25/2024    PROT 7.3 10/25/2024     10/25/2024    AST 15 10/25/2024    ALT 9 10/25/2024    BUN 16 10/25/2024    ANIONGAP 15 10/25/2024    ALBUMIN 4.4 10/25/2024    GFRF 56 (A) 07/27/2023     Lab Results   Component Value Date    TRIG 126 10/25/2024     CHOL 77 10/25/2024    LDLCALC 19 10/25/2024    HDL 33.2 10/25/2024     Lab Results   Component Value Date    HGBA1C 5.5 10/31/2024    HGBA1C 6.8 (A) 07/25/2024    HGBA1C 6.9 (A) 02/28/2024     Health Maintenance:   Foot Exam: checks occasionally - due and educated patient on importance  Eye Exam: Completed  Lipid Panel: Completed   Urine Albumin: Completed   Influenza Immunization: refuses   Pneumonia Immunization: refuses    Drug Interactions:  - Allopurinol has a lower starting dose with GFR 30-60 (patient has 54): patient has been on this dose for ~2 years so it is safe to continue therapy, but will need to monitor for any potential toxicity that may occur (rash, pruritus, elevated LFTs, N/D/V, etc)   - To note patient has not had a gout flare in a year and states her current regimen has been integral in controlling her gout flares as they are very painful    Assessment/Plan   Problem List Items Addressed This Visit       Type 2 diabetes mellitus without complication, without long-term current use of insulin (Multi)    Type 2 diabetes mellitus with chronic kidney disease, with long-term current use of insulin, unspecified CKD stage (Multi) - Primary    Hyperlipemia, mixed    Hypertension     Patient denies adrs at this time and endorses normal appetite. Will increase dose to 2 mg for improved weight loss, T2DM control, and CVD/renal protection.    INCREASE: Ozempic/Semaglutide to 2 mg once weekly     Patients diabetes is well controlled and improved with most recent A1c of 5.5 on 10/31/24. Last was 6.8% on 7/25/24 (Goal < 7%).   Continue: Ozempic to 1 mg weekly   Compliance at present is estimated to be excellent. Efforts to improve compliance (if necessary) will be directed at increased exercise.  Follow-up: I recommend diabetes care be  3 weeks  PCP Follow-Up: 1/31/25    Time Spent  Prep time on day of patient encounter: 15 minutes  Time spent directly with patient, family or caregiver: 20  minutes  Documentation Time: 20 minutes    Mame Bruce PharmD   Clinical Pharmacist Specialist, Primary Care   Phone: [phone]   Fax: [phone]   Email: [email or web address]    Continue all meds under the continuation of care with the referring provider and clinical pharmacy team.

## 2025-01-21 NOTE — ASSESSMENT & PLAN NOTE
Patient denies adrs at this time and endorses normal appetite. Will increase dose to 2 mg for improved weight loss, T2DM control, and CVD/renal protection.    INCREASE: Ozempic/Semaglutide to 2 mg once weekly     Patients diabetes is well controlled and improved with most recent A1c of 5.5 on 10/31/24. Last was 6.8% on 7/25/24 (Goal < 7%).   Continue: Ozempic to 1 mg weekly   Compliance at present is estimated to be excellent. Efforts to improve compliance (if necessary) will be directed at increased exercise.  Follow-up: I recommend diabetes care be 3 weeks  PCP Follow-Up: 1/31/25

## 2025-01-24 DIAGNOSIS — M10.9 GOUT, UNSPECIFIED: ICD-10-CM

## 2025-01-24 RX ORDER — ALLOPURINOL 300 MG/1
TABLET ORAL
Qty: 90 TABLET | Refills: 3 | Status: SHIPPED | OUTPATIENT
Start: 2025-01-24

## 2025-01-31 ENCOUNTER — APPOINTMENT (OUTPATIENT)
Dept: PRIMARY CARE | Facility: CLINIC | Age: 70
End: 2025-01-31
Payer: MEDICARE

## 2025-01-31 VITALS — BODY MASS INDEX: 37.03 KG/M2 | WEIGHT: 196 LBS

## 2025-01-31 DIAGNOSIS — E66.01 OBESITY, MORBID (MULTI): ICD-10-CM

## 2025-01-31 DIAGNOSIS — E03.9 HYPOTHYROIDISM, UNSPECIFIED TYPE: ICD-10-CM

## 2025-01-31 DIAGNOSIS — E11.22 TYPE 2 DIABETES MELLITUS WITH CHRONIC KIDNEY DISEASE, WITH LONG-TERM CURRENT USE OF INSULIN, UNSPECIFIED CKD STAGE (MULTI): ICD-10-CM

## 2025-01-31 DIAGNOSIS — Z79.4 TYPE 2 DIABETES MELLITUS WITH CHRONIC KIDNEY DISEASE, WITH LONG-TERM CURRENT USE OF INSULIN, UNSPECIFIED CKD STAGE (MULTI): ICD-10-CM

## 2025-01-31 DIAGNOSIS — E78.2 HYPERLIPEMIA, MIXED: ICD-10-CM

## 2025-01-31 DIAGNOSIS — I10 BENIGN ESSENTIAL HYPERTENSION: Primary | ICD-10-CM

## 2025-01-31 DIAGNOSIS — N18.31 CHRONIC KIDNEY DISEASE, STAGE 3A (MULTI): ICD-10-CM

## 2025-01-31 LAB — POC HEMOGLOBIN A1C: 5.6 % (ref 4.2–6.5)

## 2025-01-31 ASSESSMENT — ENCOUNTER SYMPTOMS
HEMATURIA: 0
PHOTOPHOBIA: 0
SINUS PRESSURE: 0
JOINT SWELLING: 0
NECK STIFFNESS: 0
SHORTNESS OF BREATH: 0
WOUND: 0
BLOOD IN STOOL: 0
DIARRHEA: 0
DIAPHORESIS: 0
MYALGIAS: 0
ABDOMINAL PAIN: 0
CONSTIPATION: 0
ACTIVITY CHANGE: 0
NAUSEA: 0
ARTHRALGIAS: 0
ABDOMINAL DISTENTION: 0
SINUS PAIN: 0
SORE THROAT: 0
FACIAL ASYMMETRY: 0
FREQUENCY: 0
NUMBNESS: 0
HEADACHES: 0
COLOR CHANGE: 0
BACK PAIN: 0
CHOKING: 0
TREMORS: 0
SEIZURES: 0
LIGHT-HEADEDNESS: 0
FLANK PAIN: 0
ANAL BLEEDING: 0
BRUISES/BLEEDS EASILY: 0
FATIGUE: 0
DYSURIA: 0
SLEEP DISTURBANCE: 0
EYE ITCHING: 0
TROUBLE SWALLOWING: 0
EYE PAIN: 0
WHEEZING: 0
EYE DISCHARGE: 0
EYE REDNESS: 0
CHEST TIGHTNESS: 0
CHILLS: 0
VOMITING: 0
VOICE CHANGE: 0
RHINORRHEA: 0
COUGH: 0
WEAKNESS: 0
DIFFICULTY URINATING: 0
PALPITATIONS: 0
DIZZINESS: 0
POLYPHAGIA: 0
SPEECH DIFFICULTY: 0
APPETITE CHANGE: 0
FACIAL SWELLING: 0
RECTAL PAIN: 0
ADENOPATHY: 0
STRIDOR: 0
NECK PAIN: 0
POLYDIPSIA: 0

## 2025-01-31 ASSESSMENT — PATIENT HEALTH QUESTIONNAIRE - PHQ9
1. LITTLE INTEREST OR PLEASURE IN DOING THINGS: NOT AT ALL
SUM OF ALL RESPONSES TO PHQ9 QUESTIONS 1 AND 2: 0
2. FEELING DOWN, DEPRESSED OR HOPELESS: NOT AT ALL

## 2025-01-31 ASSESSMENT — ACTIVITIES OF DAILY LIVING (ADL)
GROCERY_SHOPPING: INDEPENDENT
DOING_HOUSEWORK: INDEPENDENT
BATHING: INDEPENDENT
MANAGING_FINANCES: INDEPENDENT
TAKING_MEDICATION: INDEPENDENT
DRESSING: INDEPENDENT

## 2025-01-31 NOTE — PROGRESS NOTES
Subjective   Patient ID: Hope Dale is a 69 y.o. female who presents for Medicare Annual Wellness Visit Subsequent.    Patient presents for wellness exam and follow-up.  She has been compliant with her medications, diet but not exercise.  She she overall feels well.  She denies any headaches, no dizziness, no sinus problems, no chest pain or shortness of breath.  She denies abdominal pain no nausea vomiting or diarrhea.  She reports no new musculoskeletal complaints         Review of Systems   Constitutional:  Negative for activity change, appetite change, chills, diaphoresis and fatigue.   HENT:  Negative for congestion, dental problem, drooling, ear discharge, ear pain, facial swelling, hearing loss, mouth sores, nosebleeds, postnasal drip, rhinorrhea, sinus pressure, sinus pain, sneezing, sore throat, tinnitus, trouble swallowing and voice change.    Eyes:  Negative for photophobia, pain, discharge, redness, itching and visual disturbance.   Respiratory:  Negative for cough, choking, chest tightness, shortness of breath, wheezing and stridor.    Cardiovascular:  Negative for chest pain, palpitations and leg swelling.   Gastrointestinal:  Negative for abdominal distention, abdominal pain, anal bleeding, blood in stool, constipation, diarrhea, nausea, rectal pain and vomiting.   Endocrine: Negative for cold intolerance, heat intolerance, polydipsia, polyphagia and polyuria.   Genitourinary:  Negative for decreased urine volume, difficulty urinating, dysuria, enuresis, flank pain, frequency, genital sores, hematuria and urgency.   Musculoskeletal:  Negative for arthralgias, back pain, gait problem, joint swelling, myalgias, neck pain and neck stiffness.   Skin:  Negative for color change, pallor, rash and wound.   Neurological:  Negative for dizziness, tremors, seizures, syncope, facial asymmetry, speech difficulty, weakness, light-headedness, numbness and headaches.   Hematological:  Negative for adenopathy.  Does not bruise/bleed easily.   Psychiatric/Behavioral:  Negative for sleep disturbance.        Objective   Wt 88.9 kg (196 lb)   BMI 37.03 kg/m²     Physical Exam  Constitutional:       Appearance: Normal appearance.   Cardiovascular:      Rate and Rhythm: Normal rate and regular rhythm.      Heart sounds: No murmur heard.     No gallop.   Pulmonary:      Effort: No respiratory distress.      Breath sounds: No wheezing or rales.   Abdominal:      General: There is no distension.      Palpations: There is no mass.      Tenderness: There is no abdominal tenderness. There is no guarding.   Musculoskeletal:      Right lower leg: No edema.      Left lower leg: No edema.   Neurological:      Mental Status: She is alert.         Assessment/Plan   Diagnoses and all orders for this visit:  Benign essential hypertension-low-salt diet and exercise  Type 2 diabetes mellitus with chronic kidney disease, with long-term current use of insulin, unspecified CKD stage (Multi)- hbaic= 5.6- optho has been done.  Body mass index (BMI) 40.0-44.9, adult (Multi)-she will diet and exercise  Chronic kidney disease, stage 3a (Multi)-will monitor creatinine  Hyperlipemia, mixed-we will continue with statin  Hypothyroidism, unspecified type-we will continue with Synthroid  Obesity, morbid (Multi)--she will diet and exercise.  Health maintenance-colonoscopy has been done.  Refuses all immunizations.  Mammogram has been done.  Bone density is pending.  Eye appointment has been done.  Dental appointment is pending.  Advance care planning discussed.  Pap with GYN.

## 2025-02-12 DIAGNOSIS — I10 ESSENTIAL (PRIMARY) HYPERTENSION: ICD-10-CM

## 2025-02-13 RX ORDER — AMLODIPINE AND VALSARTAN 10; 320 MG/1; MG/1
TABLET ORAL
Qty: 90 TABLET | Refills: 3 | Status: SHIPPED | OUTPATIENT
Start: 2025-02-13

## 2025-02-17 ASSESSMENT — ENCOUNTER SYMPTOMS
VISUAL CHANGE: 0
POLYDIPSIA: 0
WEAKNESS: 0
DIZZINESS: 1
POLYPHAGIA: 0
BLURRED VISION: 0
WEIGHT LOSS: 0
FATIGUE: 0

## 2025-02-17 NOTE — PROGRESS NOTES
Clinical Pharmacist Visit    Patient is sent at the request of Dr. Gillespie for my opinion regarding Type 2 diabetes.  My final recommendations will be communicated back to the requesting provider by way of shared medical record.    Last PCP visit: 1/31/25  She has been compliant with her medications, diet but not exercise.   Benign essential hypertension-low-salt diet and exercise  Type 2 diabetes mellitus with chronic kidney disease, with long-term current use of insulin, unspecified CKD stage (Multi)- hbaic= 5.6- optho has been done.  Body mass index (BMI) 40.0-44.9, adult (Multi)-she will diet and exercise  Chronic kidney disease, stage 3a (Multi)-will monitor creatinine  Hyperlipemia, mixed-we will continue with statin  Hypothyroidism, unspecified type-we will continue with Synthroid  Obesity, morbid (Multi)--she will diet and exercise.  Health maintenance-colonoscopy has been done.  Refuses all immunizations.  Mammogram has been done.  Bone density is pending.  Eye appointment has been done.  Dental appointment is pending.  Advance care planning discussed.  Pap with GYN.    Last Pharmacy Visit: 1/21/25  Patient denies adrs at this time and endorses normal appetite. Will increase dose to 2 mg for improved weight loss, T2DM control, and CVD/renal protection.  INCREASE: Ozempic/Semaglutide to 2 mg once weekly   Patients diabetes is well controlled and improved with most recent A1c of 5.5 on 10/31/24. Last was 6.8% on 7/25/24 (Goal < 7%). Compliance at present is estimated to be excellent. Efforts to improve compliance (if necessary) will be directed at increased exercise.  Follow-up: I recommend diabetes care be 3 weeks  11/26/25  Patient has been experiencing some nausea and constipation since starting Ozempic. We discussed continuing with the 1mg or increasing and the patient was most agreeable to continue on the 1 mg dose and wait to increase at the next appointment. Patient has noted trends with GI symptoms  depending on her diet and when she does eat spicy foods she uses famotidine and this relieves her symptoms. She Is seeing additional weight loss but reporting appetite suppression has lessened with the 1 mg dose.     Subjective     Patient referred for T2DM management. She was recently started on Ozempic with dose titration, tolerating well with occasional constipation.  Her current HgA1c is at goal.     Diabetes  She presents for her follow-up diabetic visit. She has type 2 diabetes mellitus. The initial diagnosis of diabetes was made 10 years ago. Her disease course has been improving. Hypoglycemia symptoms include dizziness. (Reports missing a meal ) Pertinent negatives for diabetes include no blurred vision, no chest pain, no fatigue, no foot paresthesias, no foot ulcerations, no polydipsia, no polyphagia, no polyuria, no visual change, no weakness and no weight loss. Symptoms are improving. She is compliant with treatment all of the time. An ACE inhibitor/angiotensin II receptor blocker is being taken. Eye exam is current.     Past Medical History:  She has a past medical history of Elevated blood-pressure reading, without diagnosis of hypertension, Other conditions influencing health status, Personal history of other diseases of the circulatory system, and Personal history of other diseases of the musculoskeletal system and connective tissue.    Past Surgical History:  She has a past surgical history that includes Hysterectomy (06/30/2014); Foot surgery (06/30/2014); Hand tendon surgery (06/30/2014); Knee surgery (06/30/2014); and Colonoscopy (01/2021).    Social History:  She reports that she quit smoking about 42 years ago. Her smoking use included cigarettes. She started smoking about 59 years ago. She has a 4.3 pack-year smoking history. She has never used smokeless tobacco. She reports that she does not currently use alcohol. She reports that she does not use drugs.    Family History:  Family History    Problem Relation Name Age of Onset    Aneurysm Mother      Heart failure Father      Kidney disease Brother      Hypertension Other      Diabetes Other       Allergies:  Patient has no known allergies.    Current diet:  eating better and smaller portions since starting ozempic, less cravings and endorses improved appetite   Current exercise: walking    The patient does not have a known family history of diabetes.    Diabetes Pharmacotherapy:    Current medications:  - Ozempic 1 mg - takes on Sundays    - Has been on this    - Now has more hunger     Adverse Effects: at todays appointment she denies additional adrs  Prior Appts  Constipation - recommended miralax PRN   improved with dietary choices such as including more vegetables/fiber  Nausea - recommended taking at bedtime   only if she is eating sugary candy, this is different from when she first started taking this and has subsided since then unless she has dietary indiscretions  Heartburn - only with increased dose 1 mg   Belching - states this is new and has been fairly constant. Recommended to continue to use famotidine and trial simethicone or omeprazole otc if the other two bring no relief    Previous medications:   - Ozempic 1 mg x 1 months 0.5 mg weekly x 2 months - previously x 4 weeks of 0.25 mg   - Metformin  mg BID     SMBG reporting - none   - Previous appt: FBG 94, 97 and she has not had FBG <100 historically     Denies any hypoglycemia     Weight loss:   Wants to walk more, knee pain is worsening   Knee pain worsening with weight loss  Baseline weight: 224  Current weight: 1/21/25: 196 lbs   198 lbs  11/26/24: 201-203 lbs  8/22/24: 205 lbs     Primary/Secondary Prevention   - Statin? Yes  - ACE-I/ARB? Yes  - Aspirin? No - upsets her stomach     Pertinent PMH Review:  - PMH of Pancreatitis: No  - PMH of Retinopathy: No  - PMH of Urinary Tract Infections: No  - PMH of MTC: No    Affordability  - Ozempic is $47 for one month   - Lives in an  apartment so no mail order as the door is unreliable (oral meds go from $16 to $0 if mail order)    ]Objective     Last Recorded Vitals:  BP Readings from Last 6 Encounters:   10/31/24 110/74   07/25/24 126/78   02/28/24 120/80   11/30/23 124/80   11/09/23 122/78   10/03/23 116/74        Wt Readings from Last 6 Encounters:   01/31/25 88.9 kg (196 lb)   11/13/24 93 kg (205 lb)   10/31/24 93 kg (205 lb)   07/25/24 101 kg (222 lb 9.6 oz)   02/28/24 103 kg (226 lb)   11/30/23 104 kg (230 lb)     Lab Review  Lab Results   Component Value Date    BILITOT 0.5 10/25/2024    CALCIUM 10.0 10/25/2024    CO2 29 10/25/2024     10/25/2024    CREATININE 1.09 (H) 10/25/2024    GLUCOSE 91 10/25/2024    ALKPHOS 95 10/25/2024    K 3.8 10/25/2024    PROT 7.3 10/25/2024     10/25/2024    AST 15 10/25/2024    ALT 9 10/25/2024    BUN 16 10/25/2024    ANIONGAP 15 10/25/2024    ALBUMIN 4.4 10/25/2024    GFRF 56 (A) 07/27/2023     Lab Results   Component Value Date    TRIG 126 10/25/2024    CHOL 77 10/25/2024    LDLCALC 19 10/25/2024    HDL 33.2 10/25/2024     Lab Results   Component Value Date    HGBA1C 5.6 01/31/2025    HGBA1C 5.5 10/31/2024    HGBA1C 6.8 (A) 07/25/2024     Health Maintenance:   Foot Exam: checks occasionally - due and educated patient on importance  Eye Exam: Completed  Lipid Panel: Completed   Urine Albumin: Completed   Influenza Immunization: refuses   Pneumonia Immunization: refuses    Drug Interactions:  - Allopurinol has a lower starting dose with GFR 30-60 (patient has 54): patient has been on this dose for ~2 years so it is safe to continue therapy, but will need to monitor for any potential toxicity that may occur (rash, pruritus, elevated LFTs, N/D/V, etc)   - To note patient has not had a gout flare in a year and states her current regimen has been integral in controlling her gout flares as they are very painful    Assessment/Plan   Problem List Items Addressed This Visit       Chronic kidney  disease, stage 3a (Multi)    Type 2 diabetes mellitus with chronic kidney disease, with long-term current use of insulin, unspecified CKD stage (Multi) - Primary    Hyperlipemia, mixed    Hyperglycemia    Hypertension     Patient's Hba1c is at goal at 5.6% on 1/31/25 (<7) but slightly worsened from 3 months ago at 5.5%.    Patient denies adrs at this time and endorses a regular appetite. Will will continue 2 mg for improved weight loss, T2DM control, and CVD/renal protection.    CONTINUE: Ozempic/Semaglutide to 2 mg once weekly     Patients diabetes is well controlled and improved with most recent A1c of 5.5 on 10/31/24. Last was 6.8% on 7/25/24 (Goal < 7%).   Compliance at present is estimated to be excellent. Efforts to improve compliance (if necessary) will be directed at increased exercise.  Follow-up: I recommend diabetes care be  3 weeks  PCP Follow-Up: 4/28/25    Mame Bruce, PharmD   Clinical Pharmacist Specialist, Primary Care   Phone: [phone]   Fax: [phone]   Email: [email or web address]    Continue all meds under the continuation of care with the referring provider and clinical pharmacy team.

## 2025-02-18 ENCOUNTER — APPOINTMENT (OUTPATIENT)
Dept: PHARMACY | Facility: HOSPITAL | Age: 70
End: 2025-02-18
Payer: MEDICARE

## 2025-02-18 DIAGNOSIS — E11.22 TYPE 2 DIABETES MELLITUS WITH CHRONIC KIDNEY DISEASE, WITH LONG-TERM CURRENT USE OF INSULIN, UNSPECIFIED CKD STAGE (MULTI): Primary | ICD-10-CM

## 2025-02-18 DIAGNOSIS — R73.9 HYPERGLYCEMIA: ICD-10-CM

## 2025-02-18 DIAGNOSIS — I10 HYPERTENSION, UNSPECIFIED TYPE: ICD-10-CM

## 2025-02-18 DIAGNOSIS — N18.31 CHRONIC KIDNEY DISEASE, STAGE 3A (MULTI): ICD-10-CM

## 2025-02-18 DIAGNOSIS — Z79.4 TYPE 2 DIABETES MELLITUS WITH CHRONIC KIDNEY DISEASE, WITH LONG-TERM CURRENT USE OF INSULIN, UNSPECIFIED CKD STAGE (MULTI): Primary | ICD-10-CM

## 2025-02-18 DIAGNOSIS — E78.2 HYPERLIPEMIA, MIXED: ICD-10-CM

## 2025-02-18 DIAGNOSIS — I10 ESSENTIAL (PRIMARY) HYPERTENSION: ICD-10-CM

## 2025-02-18 RX ORDER — INDAPAMIDE 2.5 MG/1
2.5 TABLET ORAL DAILY
Qty: 90 TABLET | Refills: 3 | Status: SHIPPED | OUTPATIENT
Start: 2025-02-18 | End: 2026-02-18

## 2025-02-18 NOTE — ASSESSMENT & PLAN NOTE
Patient's Hba1c is at goal at 5.6% on 1/31/25 (<7) but slightly worsened from 3 months ago at 5.5%.    Patient denies adrs at this time and endorses a regular appetite. Will will continue 2 mg for improved weight loss, T2DM control, and CVD/renal protection.    CONTINUE: Ozempic/Semaglutide to 2 mg once weekly     Patients diabetes is well controlled and improved with most recent A1c of 5.5 on 10/31/24. Last was 6.8% on 7/25/24 (Goal < 7%).   Compliance at present is estimated to be excellent. Efforts to improve compliance (if necessary) will be directed at increased exercise.  Follow-up: I recommend diabetes care be 3 weeks  PCP Follow-Up: 4/28/25

## 2025-04-01 ENCOUNTER — APPOINTMENT (OUTPATIENT)
Dept: PHARMACY | Facility: HOSPITAL | Age: 70
End: 2025-04-01
Payer: MEDICARE

## 2025-04-01 DIAGNOSIS — E78.2 HYPERLIPEMIA, MIXED: ICD-10-CM

## 2025-04-01 DIAGNOSIS — Z79.4 TYPE 2 DIABETES MELLITUS WITH CHRONIC KIDNEY DISEASE, WITH LONG-TERM CURRENT USE OF INSULIN, UNSPECIFIED CKD STAGE (MULTI): ICD-10-CM

## 2025-04-01 DIAGNOSIS — E11.22 TYPE 2 DIABETES MELLITUS WITH CHRONIC KIDNEY DISEASE, WITH LONG-TERM CURRENT USE OF INSULIN, UNSPECIFIED CKD STAGE (MULTI): ICD-10-CM

## 2025-04-01 DIAGNOSIS — I10 BENIGN ESSENTIAL HYPERTENSION: ICD-10-CM

## 2025-04-01 DIAGNOSIS — E66.01 OBESITY, MORBID (MULTI): Primary | ICD-10-CM

## 2025-04-01 DIAGNOSIS — N18.31 CHRONIC KIDNEY DISEASE, STAGE 3A (MULTI): ICD-10-CM

## 2025-04-01 DIAGNOSIS — I10 PRIMARY HYPERTENSION: ICD-10-CM

## 2025-04-01 DIAGNOSIS — E11.9 TYPE 2 DIABETES MELLITUS WITHOUT COMPLICATION, WITHOUT LONG-TERM CURRENT USE OF INSULIN: ICD-10-CM

## 2025-04-01 ASSESSMENT — ENCOUNTER SYMPTOMS
WEIGHT LOSS: 0
POLYPHAGIA: 0
POLYDIPSIA: 0
FATIGUE: 0
BLURRED VISION: 0
VISUAL CHANGE: 0
DIZZINESS: 1
WEAKNESS: 0

## 2025-04-01 NOTE — PROGRESS NOTES
Clinical Pharmacist Visit    Patient is sent at the request of Dr. Gillespie for my opinion regarding Type 2 diabetes.  My final recommendations will be communicated back to the requesting provider by way of shared medical record.  Subjective     Patient referred for T2DM management. She was recently started on Ozempic with dose titration, tolerating well with occasional constipation.  Her current HgA1c is at goal.     Diabetes  She presents for her follow-up diabetic visit. She has type 2 diabetes mellitus. The initial diagnosis of diabetes was made 10 years ago. Her disease course has been improving. Hypoglycemia symptoms include dizziness. (Reports missing a meal ) Pertinent negatives for diabetes include no blurred vision, no chest pain, no fatigue, no foot paresthesias, no foot ulcerations, no polydipsia, no polyphagia, no polyuria, no visual change, no weakness and no weight loss. Symptoms are improving. She is compliant with treatment all of the time. An ACE inhibitor/angiotensin II receptor blocker is being taken. Eye exam is current.     Past Medical History:  She has a past medical history of Elevated blood-pressure reading, without diagnosis of hypertension, Other conditions influencing health status, Personal history of other diseases of the circulatory system, and Personal history of other diseases of the musculoskeletal system and connective tissue.    Past Surgical History:  She has a past surgical history that includes Hysterectomy (06/30/2014); Foot surgery (06/30/2014); Hand tendon surgery (06/30/2014); Knee surgery (06/30/2014); and Colonoscopy (01/2021).    Social History:  She reports that she quit smoking about 42 years ago. Her smoking use included cigarettes. She started smoking about 59 years ago. She has a 4.3 pack-year smoking history. She has never used smokeless tobacco. She reports that she does not currently use alcohol. She reports that she does not use drugs.    Family History:  Family  History   Problem Relation Name Age of Onset    Aneurysm Mother      Heart failure Father      Kidney disease Brother      Hypertension Other      Diabetes Other       Allergies:  Patient has no known allergies.    Current diet:  eating better and smaller portions since starting ozempic, less cravings and endorses improved appetite   Current exercise: walking    The patient does not have a known family history of diabetes.    Diabetes Pharmacotherapy:    Current medications:  - Ozempic 2 mg - takes on Sundays     Adverse Effects: at todays appointment she has had sulphur belching for 2 days - was unable to go Taoist. Resolved on its own. She has a   Prior Appts  Constipation - recommended miralax PRN   improved with dietary choices such as including more vegetables/fiber  NEW   Nausea - recommended taking at bedtime  A couple seconds every day she has nausea   Previously   only if she is eating sugary candy, this is different from when she first started taking this and has subsided since then unless she has dietary indiscretions  Heartburn - takes famotidine at baseline  NEW to ozempic   Belching - states this is new and has been fairly constant. Recommended to continue to use famotidine and trial simethicone or omeprazole otc if the other two bring no relief    Previous medications:   - Ozempic 1 mg x 1 months 0.5 mg weekly x 2 months - previously x 4 weeks of 0.25 mg   - Metformin  mg BID     SMBG reporting - none   - Previous appt: FBG 94, 97 and she has not had FBG <100 historically     Denies any hypoglycemia     Weight loss:   Wants to walk more, knee pain is worsening   Knee pain worsening with weight loss  Baseline weight: 224  Current weight: 1/21/25: 196 lbs   198 lbs  11/26/24: 201-203 lbs  8/22/24: 205 lbs     BP:   125/79 - after she hydrated  Dehydrated  107/57  94/54    Primary/Secondary Prevention   - Statin? Yes  - ACE-I/ARB? Yes  - Aspirin? No - upsets her stomach     Pertinent PMH Review:  -  PMH of Pancreatitis: No  - PMH of Retinopathy: No  - PMH of Urinary Tract Infections: No  - PMH of MTC: No    Affordability  - Ozempic is $47 for one month   - Lives in an apartment so no mail order as the door is unreliable (oral meds go from $16 to $0 if mail order)    ]Objective     Last Recorded Vitals:  BP Readings from Last 6 Encounters:   10/31/24 110/74   07/25/24 126/78   02/28/24 120/80   11/30/23 124/80   11/09/23 122/78   10/03/23 116/74      .med    Wt Readings from Last 6 Encounters:   01/31/25 88.9 kg (196 lb)   11/13/24 93 kg (205 lb)   10/31/24 93 kg (205 lb)   07/25/24 101 kg (222 lb 9.6 oz)   02/28/24 103 kg (226 lb)   11/30/23 104 kg (230 lb)     Lab Review  Lab Results   Component Value Date    BILITOT 0.5 10/25/2024    CALCIUM 10.0 10/25/2024    CO2 29 10/25/2024     10/25/2024    CREATININE 1.09 (H) 10/25/2024    GLUCOSE 91 10/25/2024    ALKPHOS 95 10/25/2024    K 3.8 10/25/2024    PROT 7.3 10/25/2024     10/25/2024    AST 15 10/25/2024    ALT 9 10/25/2024    BUN 16 10/25/2024    ANIONGAP 15 10/25/2024    ALBUMIN 4.4 10/25/2024    GFRF 56 (A) 07/27/2023     Lab Results   Component Value Date    TRIG 126 10/25/2024    CHOL 77 10/25/2024    LDLCALC 19 10/25/2024    HDL 33.2 10/25/2024     Lab Results   Component Value Date    HGBA1C 5.6 01/31/2025    HGBA1C 5.5 10/31/2024    HGBA1C 6.8 (A) 07/25/2024     Health Maintenance:   Foot Exam: checks occasionally - due and educated patient on importance  Eye Exam: Completed  Lipid Panel: Completed   Urine Albumin: Completed   Influenza Immunization: refuses   Pneumonia Immunization: refuses    Drug Interactions:  - Allopurinol has a lower starting dose with GFR 30-60 (patient has 54): patient has been on this dose for ~2 years so it is safe to continue therapy, but will need to monitor for any potential toxicity that may occur (rash, pruritus, elevated LFTs, N/D/V, etc)   - To note patient has not had a gout flare in a year and states her  current regimen has been integral in controlling her gout flares as they are very painful    Assessment/Plan   Problem List Items Addressed This Visit    None      Patient's Hba1c is at goal at 5.6% on 1/31/25 (<7) but slightly worsened from 3 months ago at 5.5%.    Patient denies adrs at this time and endorses a regular appetite. Will will continue 2 mg for improved weight loss, T2DM control, and CVD/renal protection.    CONTINUE: Ozempic/Semaglutide to 2 mg once weekly     Patients diabetes is well controlled and improved with most recent A1c of 5.5 on 10/31/24. Last was 6.8% on 7/25/24 (Goal < 7%).   Compliance at present is estimated to be excellent. Efforts to improve compliance (if necessary) will be directed at increased exercise.  Follow-up: I recommend diabetes care be  2 months  After PCP appt  See if having any ADRs  Check in on diet  Check in on BP and hydration status  PCP Follow-Up: 4/28/25    Mame Bruce, PharmD   Clinical Pharmacist Specialist, Primary Care   Phone: [phone]   Fax: [phone]   Email: [email or web address]    Continue all meds under the continuation of care with the referring provider and clinical pharmacy team.

## 2025-04-20 DIAGNOSIS — E11.9 TYPE 2 DIABETES MELLITUS WITHOUT COMPLICATIONS: ICD-10-CM

## 2025-04-21 RX ORDER — METFORMIN HYDROCHLORIDE 500 MG/1
1000 TABLET, EXTENDED RELEASE ORAL DAILY
Qty: 180 TABLET | Refills: 3 | Status: SHIPPED | OUTPATIENT
Start: 2025-04-21

## 2025-04-28 ENCOUNTER — APPOINTMENT (OUTPATIENT)
Dept: PRIMARY CARE | Facility: CLINIC | Age: 70
End: 2025-04-28
Payer: MEDICARE

## 2025-04-28 VITALS
SYSTOLIC BLOOD PRESSURE: 106 MMHG | HEART RATE: 72 BPM | BODY MASS INDEX: 34.96 KG/M2 | DIASTOLIC BLOOD PRESSURE: 74 MMHG | WEIGHT: 185 LBS

## 2025-04-28 DIAGNOSIS — E11.22 TYPE 2 DIABETES MELLITUS WITH CHRONIC KIDNEY DISEASE, WITH LONG-TERM CURRENT USE OF INSULIN, UNSPECIFIED CKD STAGE (MULTI): Primary | ICD-10-CM

## 2025-04-28 DIAGNOSIS — E78.2 HYPERLIPEMIA, MIXED: ICD-10-CM

## 2025-04-28 DIAGNOSIS — E03.9 HYPOTHYROIDISM, UNSPECIFIED TYPE: ICD-10-CM

## 2025-04-28 DIAGNOSIS — I10 BENIGN ESSENTIAL HYPERTENSION: ICD-10-CM

## 2025-04-28 DIAGNOSIS — Z79.4 TYPE 2 DIABETES MELLITUS WITH CHRONIC KIDNEY DISEASE, WITH LONG-TERM CURRENT USE OF INSULIN, UNSPECIFIED CKD STAGE (MULTI): Primary | ICD-10-CM

## 2025-04-28 DIAGNOSIS — E11.9 TYPE 2 DIABETES MELLITUS WITHOUT COMPLICATION, WITHOUT LONG-TERM CURRENT USE OF INSULIN: ICD-10-CM

## 2025-04-28 DIAGNOSIS — N18.31 CHRONIC KIDNEY DISEASE, STAGE 3A (MULTI): ICD-10-CM

## 2025-04-28 LAB — POC HEMOGLOBIN A1C: 5.6 % (ref 4.2–6.5)

## 2025-04-28 PROCEDURE — 99214 OFFICE O/P EST MOD 30 MIN: CPT | Performed by: INTERNAL MEDICINE

## 2025-04-28 PROCEDURE — 1159F MED LIST DOCD IN RCRD: CPT | Performed by: INTERNAL MEDICINE

## 2025-04-28 PROCEDURE — 83036 HEMOGLOBIN GLYCOSYLATED A1C: CPT | Performed by: INTERNAL MEDICINE

## 2025-04-28 PROCEDURE — 3074F SYST BP LT 130 MM HG: CPT | Performed by: INTERNAL MEDICINE

## 2025-04-28 PROCEDURE — 3044F HG A1C LEVEL LT 7.0%: CPT | Performed by: INTERNAL MEDICINE

## 2025-04-28 PROCEDURE — 1160F RVW MEDS BY RX/DR IN RCRD: CPT | Performed by: INTERNAL MEDICINE

## 2025-04-28 PROCEDURE — G2211 COMPLEX E/M VISIT ADD ON: HCPCS | Performed by: INTERNAL MEDICINE

## 2025-04-28 PROCEDURE — 1126F AMNT PAIN NOTED NONE PRSNT: CPT | Performed by: INTERNAL MEDICINE

## 2025-04-28 PROCEDURE — 3078F DIAST BP <80 MM HG: CPT | Performed by: INTERNAL MEDICINE

## 2025-04-28 RX ORDER — INDAPAMIDE 1.25 MG/1
1.25 TABLET ORAL EVERY MORNING
Qty: 30 TABLET | Refills: 11 | Status: SHIPPED | OUTPATIENT
Start: 2025-04-28 | End: 2026-04-28

## 2025-04-28 ASSESSMENT — ENCOUNTER SYMPTOMS
FACIAL ASYMMETRY: 0
VOICE CHANGE: 0
DIFFICULTY URINATING: 0
COLOR CHANGE: 0
POLYPHAGIA: 0
LIGHT-HEADEDNESS: 0
SEIZURES: 0
FLANK PAIN: 0
SINUS PAIN: 0
PALPITATIONS: 0
HEADACHES: 0
EYE ITCHING: 0
TREMORS: 0
ANAL BLEEDING: 0
BLOOD IN STOOL: 0
SHORTNESS OF BREATH: 0
VOMITING: 0
SLEEP DISTURBANCE: 0
HEMATURIA: 0
ARTHRALGIAS: 0
DIZZINESS: 1
CHEST TIGHTNESS: 0
STRIDOR: 0
SORE THROAT: 0
NAUSEA: 0
CONSTIPATION: 0
SPEECH DIFFICULTY: 0
CHILLS: 0
EYE REDNESS: 0
FATIGUE: 0
WHEEZING: 0
EYE PAIN: 0
DIARRHEA: 0
POLYDIPSIA: 0
BRUISES/BLEEDS EASILY: 0
ABDOMINAL DISTENTION: 0
EYE DISCHARGE: 0
PHOTOPHOBIA: 0
ADENOPATHY: 0
JOINT SWELLING: 0
BACK PAIN: 0
APPETITE CHANGE: 0
ABDOMINAL PAIN: 0
FACIAL SWELLING: 0
SINUS PRESSURE: 0
RECTAL PAIN: 0
ACTIVITY CHANGE: 0
FREQUENCY: 0
WOUND: 0
NUMBNESS: 0
WEAKNESS: 0
TROUBLE SWALLOWING: 0
DIAPHORESIS: 0
COUGH: 0
CHOKING: 0
NECK PAIN: 0
DYSURIA: 0
MYALGIAS: 0
NECK STIFFNESS: 0
RHINORRHEA: 0

## 2025-04-28 ASSESSMENT — PAIN SCALES - GENERAL: PAINLEVEL_OUTOF10: 0-NO PAIN

## 2025-04-28 NOTE — PROGRESS NOTES
Subjective   Patient ID: Hope Dale is a 69 y.o. female who presents for Follow-up.    Patient presents for follow-up.  She has been compliant with her medications and diet.  She has been complaining of occasional dizziness.  She also feels she has been dehydrated since losing weight.  She denies any headaches, no dizziness, no chest pain or shortness of breath.  She denies abdominal pain no nausea vomiting or diarrhea.  She reports no new musculoskeletal complaints.         Review of Systems   Constitutional:  Negative for activity change, appetite change, chills, diaphoresis and fatigue.   HENT:  Negative for congestion, dental problem, drooling, ear discharge, ear pain, facial swelling, hearing loss, mouth sores, nosebleeds, postnasal drip, rhinorrhea, sinus pressure, sinus pain, sneezing, sore throat, tinnitus, trouble swallowing and voice change.    Eyes:  Negative for photophobia, pain, discharge, redness, itching and visual disturbance.   Respiratory:  Negative for cough, choking, chest tightness, shortness of breath, wheezing and stridor.    Cardiovascular:  Negative for chest pain, palpitations and leg swelling.   Gastrointestinal:  Negative for abdominal distention, abdominal pain, anal bleeding, blood in stool, constipation, diarrhea, nausea, rectal pain and vomiting.   Endocrine: Negative for cold intolerance, heat intolerance, polydipsia, polyphagia and polyuria.   Genitourinary:  Negative for decreased urine volume, difficulty urinating, dysuria, enuresis, flank pain, frequency, genital sores, hematuria and urgency.   Musculoskeletal:  Negative for arthralgias, back pain, gait problem, joint swelling, myalgias, neck pain and neck stiffness.   Skin:  Negative for color change, pallor, rash and wound.   Neurological:  Positive for dizziness. Negative for tremors, seizures, syncope, facial asymmetry, speech difficulty, weakness, light-headedness, numbness and headaches.   Hematological:  Negative for  adenopathy. Does not bruise/bleed easily.   Psychiatric/Behavioral:  Negative for sleep disturbance.        Objective   /74   Pulse 72   Wt 83.9 kg (185 lb)   BMI 34.96 kg/m²     Physical Exam  Constitutional:       Appearance: Normal appearance.   Cardiovascular:      Rate and Rhythm: Normal rate and regular rhythm.      Heart sounds: No murmur heard.     No gallop.   Pulmonary:      Effort: No respiratory distress.      Breath sounds: No wheezing or rales.   Abdominal:      General: There is no distension.      Palpations: There is no mass.      Tenderness: There is no abdominal tenderness. There is no guarding.   Musculoskeletal:      Right lower leg: No edema.      Left lower leg: No edema.   Neurological:      Mental Status: She is alert.         Assessment/Plan   Diagnoses and all orders for this visit:  Type 2 diabetes mellitus with chronic kidney disease, with long-term current use of insulin, unspecified CKD stage (Multi)-hemoglobin A1c was 5.6.  Will continue present management.  Ophthalmology appointment has been done  -     POCT glycosylated hemoglobin (Hb A1C) manually resulted  Benign essential hypertension-decrease indapamide to 1.25 mg daily..  She was getting dizzy.  -     indapamide (Lozol) 1.25 mg tablet; Take 1 tablet (1.25 mg) by mouth once daily in the morning.  Hypothyroidism, unspecified type-will continue with Synthroid  Type 2 diabetes mellitus without complication, without long-term current use of insulin  Chronic kidney disease, stage 3a (Multi)-recheck creatinine at next visit  Hyperlipemia, mixed-will continue with statin.  Health maintenance-colonoscopy next year.  Refuses all immunizations.  Mammogram has been done.  Bone density is pending.  She will schedule eye and dental appointment.  Pap with GYN.  Obesity-patient congratulated on her weight loss

## 2025-05-09 DIAGNOSIS — I10 ESSENTIAL (PRIMARY) HYPERTENSION: ICD-10-CM

## 2025-05-09 RX ORDER — INDAPAMIDE 2.5 MG/1
2.5 TABLET ORAL DAILY
Qty: 90 TABLET | Refills: 3 | Status: SHIPPED | OUTPATIENT
Start: 2025-05-09

## 2025-05-27 ENCOUNTER — APPOINTMENT (OUTPATIENT)
Dept: PHARMACY | Facility: HOSPITAL | Age: 70
End: 2025-05-27
Payer: MEDICARE

## 2025-05-27 DIAGNOSIS — I10 PRIMARY HYPERTENSION: ICD-10-CM

## 2025-05-27 DIAGNOSIS — E66.01 OBESITY, MORBID (MULTI): Primary | ICD-10-CM

## 2025-05-27 DIAGNOSIS — E11.9 TYPE 2 DIABETES MELLITUS WITHOUT COMPLICATION, WITHOUT LONG-TERM CURRENT USE OF INSULIN: ICD-10-CM

## 2025-05-27 DIAGNOSIS — E78.2 HYPERLIPEMIA, MIXED: ICD-10-CM

## 2025-05-27 DIAGNOSIS — R73.9 HYPERGLYCEMIA: ICD-10-CM

## 2025-05-27 DIAGNOSIS — N18.31 CHRONIC KIDNEY DISEASE, STAGE 3A (MULTI): ICD-10-CM

## 2025-05-27 DIAGNOSIS — I10 BENIGN ESSENTIAL HYPERTENSION: ICD-10-CM

## 2025-05-27 RX ORDER — INDAPAMIDE 1.25 MG/1
1.25 TABLET ORAL EVERY MORNING
COMMUNITY
End: 2025-05-30 | Stop reason: WASHOUT

## 2025-05-27 ASSESSMENT — ENCOUNTER SYMPTOMS
POLYDIPSIA: 0
WEAKNESS: 0
VISUAL CHANGE: 0
BLURRED VISION: 0
DIZZINESS: 1
FATIGUE: 0
WEIGHT LOSS: 0
POLYPHAGIA: 0

## 2025-05-27 NOTE — PROGRESS NOTES
Clinical Pharmacist Visit    Patient is sent at the request of Dr. Gillespie for my opinion regarding Type 2 diabetes.  My final recommendations will be communicated back to the requesting provider by way of shared medical record.  Subjective     Patient referred for T2DM management. She was recently started on Ozempic with dose titration, tolerating well with occasional constipation.  Her current HgA1c is at goal.     Diabetes  She presents for her follow-up diabetic visit. She has type 2 diabetes mellitus. The initial diagnosis of diabetes was made 10 years ago. Her disease course has been improving. Hypoglycemia symptoms include dizziness. (Reports missing a meal ) Pertinent negatives for diabetes include no blurred vision, no chest pain, no fatigue, no foot paresthesias, no foot ulcerations, no polydipsia, no polyphagia, no polyuria, no visual change, no weakness and no weight loss. Symptoms are improving. She is compliant with treatment all of the time. An ACE inhibitor/angiotensin II receptor blocker is being taken. Eye exam is current.     Past Medical History:  She has a past medical history of Elevated blood-pressure reading, without diagnosis of hypertension, Other conditions influencing health status, Personal history of other diseases of the circulatory system, and Personal history of other diseases of the musculoskeletal system and connective tissue.    Past Surgical History:  She has a past surgical history that includes Hysterectomy (06/30/2014); Foot surgery (06/30/2014); Hand tendon surgery (06/30/2014); Knee surgery (06/30/2014); and Colonoscopy (01/2021).    Social History:  She reports that she quit smoking about 42 years ago. Her smoking use included cigarettes. She started smoking about 59 years ago. She has a 4.3 pack-year smoking history. She has never used smokeless tobacco. She reports that she does not currently use alcohol. She reports that she does not use drugs.    Family History:  Family  History   Problem Relation Name Age of Onset    Aneurysm Mother      Heart failure Father      Kidney disease Brother      Hypertension Other      Diabetes Other       Allergies:  Patient has no known allergies.    Current diet: eating better and smaller portions since starting ozempic, less cravings and endorses improved appetite   Current exercise: walking    The patient does not have a known family history of diabetes.    Diabetes Pharmacotherapy:    Current medications:  - Ozempic 2 mg - takes on Sundays     Adverse Effects: at todays appointment she has had sulphur belching for 2 days - was unable to go Confucianist. Resolved on its own.  Prior Appts  Constipation - recommended miralax PRN   improved with dietary choices such as including more vegetables/fiber  NEW   Nausea - recommended taking at bedtime  A couple seconds every day she has nausea   Previously   only if she is eating sugary candy, this is different from when she first started taking this and has subsided since then unless she has dietary indiscretions  Heartburn - takes famotidine at baseline  NEW to ozempic   Belching - states this is new and has been fairly constant. Recommended to continue to use famotidine and trial simethicone or omeprazole otc if the other two bring no relief    Previous medications:   - Ozempic 1 mg x 1 months 0.5 mg weekly x 2 months - previously x 4 weeks of 0.25 mg   - Metformin  mg BID     SMBG reporting - none   - Previous appt: FBG 94, 97 and she has not had FBG <100 historically     Denies any hypoglycemia     Weight loss:   Wants to walk more, knee pain is worsening   Knee pain worsening with weight loss  Baseline weight: 224  Current weight:   198 lbs  5/27/25 - 189 lbs   1/21/25: 196 lbs   11/26/24: 201-203 lbs  8/22/24: 205 lbs     BP:   Range: 90/60 - 118/72  Average: 100/62    Primary/Secondary Prevention   - Statin? Yes  - ACE-I/ARB? Yes  - Aspirin? No - upsets her stomach     Pertinent PMH Review:  - PMH  of Pancreatitis: No  - PMH of Retinopathy: No  - PMH of Urinary Tract Infections: No  - PMH of MTC: No    Affordability  - Ozempic is $47 for one month   - Lives in an apartment so no mail order as the door is unreliable (oral meds go from $16 to $0 if mail order)    ]Objective     Last Recorded Vitals:  BP Readings from Last 6 Encounters:   04/28/25 106/74   10/31/24 110/74   07/25/24 126/78   02/28/24 120/80   11/30/23 124/80   11/09/23 122/78      .med    Wt Readings from Last 6 Encounters:   04/28/25 83.9 kg (185 lb)   01/31/25 88.9 kg (196 lb)   11/13/24 93 kg (205 lb)   10/31/24 93 kg (205 lb)   07/25/24 101 kg (222 lb 9.6 oz)   02/28/24 103 kg (226 lb)     Lab Review  Lab Results   Component Value Date    BILITOT 0.5 10/25/2024    CALCIUM 10.0 10/25/2024    CO2 29 10/25/2024     10/25/2024    CREATININE 1.09 (H) 10/25/2024    GLUCOSE 91 10/25/2024    ALKPHOS 95 10/25/2024    K 3.8 10/25/2024    PROT 7.3 10/25/2024     10/25/2024    AST 15 10/25/2024    ALT 9 10/25/2024    BUN 16 10/25/2024    ANIONGAP 15 10/25/2024    ALBUMIN 4.4 10/25/2024    GFRF 56 (A) 07/27/2023     Lab Results   Component Value Date    TRIG 126 10/25/2024    CHOL 77 10/25/2024    LDLCALC 19 10/25/2024    HDL 33.2 10/25/2024     Lab Results   Component Value Date    HGBA1C 5.6 04/28/2025    HGBA1C 5.6 01/31/2025    HGBA1C 5.5 10/31/2024     Health Maintenance:   Foot Exam: checks occasionally - due and educated patient on importance  Eye Exam: Completed  Lipid Panel: Completed   Urine Albumin: Completed   Influenza Immunization: refuses   Pneumonia Immunization: refuses    Drug Interactions:  - Allopurinol has a lower starting dose with GFR 30-60 (patient has 54): patient has been on this dose for ~2 years so it is safe to continue therapy, but will need to monitor for any potential toxicity that may occur (rash, pruritus, elevated LFTs, N/D/V, etc)   - To note patient has not had a gout flare in a year and states her current  regimen has been integral in controlling her gout flares as they are very painful    Assessment/Plan   Problem List Items Addressed This Visit       Obesity, morbid (Multi) - Primary    Type 2 diabetes mellitus without complication, without long-term current use of insulin    Chronic kidney disease, stage 3a (Multi)    Hyperlipemia, mixed    Hyperglycemia    Hypertension     Patient's Hba1c is at goal at 5.6% (<7) and is the same as 3 months prior.     Patient denies adrs at this time and endorses a regular appetite. Will will continue 2 mg for improved weight loss, T2DM control, and CVD/renal protection. Patient has reached a plateau with weight loss, will discuss switching to mounjaro at the next appt as she just picked up a new box.     BP is still low and patient endorses dizziness. PCP to evaluated discontinuing indapamide at next appt on 5/29    CONTINUE: Ozempic/Semaglutide to 2 mg once weekly     Patients diabetes is well controlled and improved with most recent A1c of 5.5 on 10/31/24. Last was 6.8% on 7/25/24 (Goal < 7%).   Compliance at present is estimated to be excellent. Efforts to improve compliance (if necessary) will be directed at increased exercise.  Follow-up: I recommend diabetes care be 3 weeks  After PCP appt  See if having any ADRs  Check in on diet  Check in on BP and hydration status  PCP Follow-Up: 5/29/25    Mame Bruce, PharmD   Clinical Pharmacist Specialist, Primary Care   Phone: [phone]   Fax: [phone]   Email: [email or web address]    Continue all meds under the continuation of care with the referring provider and clinical pharmacy team.

## 2025-05-29 ENCOUNTER — APPOINTMENT (OUTPATIENT)
Dept: PRIMARY CARE | Facility: CLINIC | Age: 70
End: 2025-05-29
Payer: MEDICARE

## 2025-05-30 ENCOUNTER — HOSPITAL ENCOUNTER (OUTPATIENT)
Dept: RADIOLOGY | Facility: CLINIC | Age: 70
Discharge: HOME | End: 2025-05-30
Payer: MEDICARE

## 2025-05-30 ENCOUNTER — OFFICE VISIT (OUTPATIENT)
Dept: PRIMARY CARE | Facility: CLINIC | Age: 70
End: 2025-05-30
Payer: MEDICARE

## 2025-05-30 VITALS
BODY MASS INDEX: 35.14 KG/M2 | DIASTOLIC BLOOD PRESSURE: 70 MMHG | SYSTOLIC BLOOD PRESSURE: 110 MMHG | HEART RATE: 88 BPM | WEIGHT: 186 LBS

## 2025-05-30 DIAGNOSIS — E66.01 SEVERE OBESITY (MULTI): ICD-10-CM

## 2025-05-30 DIAGNOSIS — E11.22 TYPE 2 DIABETES MELLITUS WITH CHRONIC KIDNEY DISEASE, WITHOUT LONG-TERM CURRENT USE OF INSULIN, UNSPECIFIED CKD STAGE (MULTI): ICD-10-CM

## 2025-05-30 DIAGNOSIS — M54.50 LOW BACK PAIN, UNSPECIFIED BACK PAIN LATERALITY, UNSPECIFIED CHRONICITY, UNSPECIFIED WHETHER SCIATICA PRESENT: ICD-10-CM

## 2025-05-30 DIAGNOSIS — E11.9 TYPE 2 DIABETES MELLITUS WITHOUT COMPLICATION, WITHOUT LONG-TERM CURRENT USE OF INSULIN: ICD-10-CM

## 2025-05-30 DIAGNOSIS — M25.551 PAIN OF RIGHT HIP: ICD-10-CM

## 2025-05-30 DIAGNOSIS — Z78.0 MENOPAUSE: ICD-10-CM

## 2025-05-30 DIAGNOSIS — E03.9 HYPOTHYROIDISM, UNSPECIFIED TYPE: ICD-10-CM

## 2025-05-30 DIAGNOSIS — N18.31 STAGE 3A CHRONIC KIDNEY DISEASE (MULTI): ICD-10-CM

## 2025-05-30 DIAGNOSIS — E78.2 HYPERLIPEMIA, MIXED: ICD-10-CM

## 2025-05-30 DIAGNOSIS — I10 BENIGN ESSENTIAL HYPERTENSION: Primary | ICD-10-CM

## 2025-05-30 PROCEDURE — 1160F RVW MEDS BY RX/DR IN RCRD: CPT | Performed by: INTERNAL MEDICINE

## 2025-05-30 PROCEDURE — 72110 X-RAY EXAM L-2 SPINE 4/>VWS: CPT

## 2025-05-30 PROCEDURE — 1036F TOBACCO NON-USER: CPT | Performed by: INTERNAL MEDICINE

## 2025-05-30 PROCEDURE — G2211 COMPLEX E/M VISIT ADD ON: HCPCS | Performed by: INTERNAL MEDICINE

## 2025-05-30 PROCEDURE — 3074F SYST BP LT 130 MM HG: CPT | Performed by: INTERNAL MEDICINE

## 2025-05-30 PROCEDURE — 73502 X-RAY EXAM HIP UNI 2-3 VIEWS: CPT | Mod: RT

## 2025-05-30 PROCEDURE — 3044F HG A1C LEVEL LT 7.0%: CPT | Performed by: INTERNAL MEDICINE

## 2025-05-30 PROCEDURE — 1126F AMNT PAIN NOTED NONE PRSNT: CPT | Performed by: INTERNAL MEDICINE

## 2025-05-30 PROCEDURE — 3078F DIAST BP <80 MM HG: CPT | Performed by: INTERNAL MEDICINE

## 2025-05-30 PROCEDURE — 99213 OFFICE O/P EST LOW 20 MIN: CPT | Performed by: INTERNAL MEDICINE

## 2025-05-30 PROCEDURE — 1159F MED LIST DOCD IN RCRD: CPT | Performed by: INTERNAL MEDICINE

## 2025-05-30 ASSESSMENT — ENCOUNTER SYMPTOMS
VOMITING: 0
HEADACHES: 0
SPEECH DIFFICULTY: 0
DYSURIA: 0
SHORTNESS OF BREATH: 0
HEMATURIA: 0
BACK PAIN: 0
DIARRHEA: 0
NECK PAIN: 0
WOUND: 0
DIZZINESS: 0
PHOTOPHOBIA: 0
FACIAL SWELLING: 0
CHOKING: 0
CONSTIPATION: 0
MYALGIAS: 0
CHEST TIGHTNESS: 0
SLEEP DISTURBANCE: 0
ACTIVITY CHANGE: 0
POLYPHAGIA: 0
ABDOMINAL PAIN: 0
DIAPHORESIS: 0
EYE DISCHARGE: 0
BLOOD IN STOOL: 0
APPETITE CHANGE: 0
COUGH: 0
WHEEZING: 0
ABDOMINAL DISTENTION: 0
RECTAL PAIN: 0
TREMORS: 0
EYE ITCHING: 0
SINUS PAIN: 0
PALPITATIONS: 0
POLYDIPSIA: 0
NAUSEA: 0
ARTHRALGIAS: 0
ADENOPATHY: 0
COLOR CHANGE: 0
EYE REDNESS: 0
FREQUENCY: 0
SEIZURES: 0
LIGHT-HEADEDNESS: 0
CHILLS: 0
SINUS PRESSURE: 0
WEAKNESS: 0
TROUBLE SWALLOWING: 0
FATIGUE: 0
JOINT SWELLING: 0
ANAL BLEEDING: 0
RHINORRHEA: 0
FACIAL ASYMMETRY: 0
SORE THROAT: 0
BRUISES/BLEEDS EASILY: 0
NUMBNESS: 0
DIFFICULTY URINATING: 0
EYE PAIN: 0
VOICE CHANGE: 0
FLANK PAIN: 0
NECK STIFFNESS: 0
STRIDOR: 0

## 2025-05-30 ASSESSMENT — PATIENT HEALTH QUESTIONNAIRE - PHQ9
2. FEELING DOWN, DEPRESSED OR HOPELESS: NOT AT ALL
SUM OF ALL RESPONSES TO PHQ9 QUESTIONS 1 AND 2: 0
SUM OF ALL RESPONSES TO PHQ9 QUESTIONS 1 AND 2: 0
1. LITTLE INTEREST OR PLEASURE IN DOING THINGS: NOT AT ALL
2. FEELING DOWN, DEPRESSED OR HOPELESS: NOT AT ALL
1. LITTLE INTEREST OR PLEASURE IN DOING THINGS: NOT AT ALL

## 2025-05-30 ASSESSMENT — PAIN SCALES - GENERAL: PAINLEVEL_OUTOF10: 0-NO PAIN

## 2025-05-30 NOTE — PROGRESS NOTES
Subjective   Patient ID: Hope Dale is a 69 y.o. female who presents for Follow-up.  History of Present Illness  The patient presents for evaluation of hypertension, diabetes, hyperlipidemia, obesity, and arthritis.    She has been monitoring her blood pressure at home, with a recent reading of 119/75. Occasional dizziness is reported in the mornings. Her current medications for hypertension include amlodipine and indapamide, which was recently reduced to 1.25 mg.    Her diabetes management includes Ozempic, with a planned transition to Mounjaro in 4 weeks. Metformin has been discontinued. She is interested in understanding the impact of Mounjaro on her A1c levels. Regular follow-ups with an ophthalmologist are maintained, and her last A1c was 5.6.    Significant weight loss has been achieved, currently maintaining a weight of 180 pounds. Her goal weight is 180 pounds, aiming to maintain a BMI of approximately 30. She has lost a total of 230 to 240 pounds.     Arthritis is reported to be well-managed, though increased pressure in her back and hips is noted as weight loss continues. Physical therapy is being considered to determine whether symptoms are due to arthritis or another condition. Lower back pain limits her ability to stand for more than 4 to 5 minutes, and pain is also reported in her right hip.    She is on Crestor (rosuvastatin) for hyperlipidemia.    Health maintenance includes a colonoscopy completed in January and a mammogram due at the end of the year. A bone density test has not yet been completed. An eye examination has been done, but she has not seen a dentist. Influenza and pneumonia vaccines are declined. Additional medications include allopurinol, levothyroxine, and aspirin.      PMHx, FHx, Social Hx, Surg Hx personally reviewed at this appointment. No pertinent findings and/or changes from prior (if applicable).    ROS: Unless specified above, pt denies wt gain/loss f/c HA LoC CP SOB NVDC.  See HPI above, and scanned sheet (if applicable). All other systems are reviewed and are without complaint.   Review of Systems   Constitutional:  Negative for activity change, appetite change, chills, diaphoresis and fatigue.   HENT:  Negative for congestion, dental problem, drooling, ear discharge, ear pain, facial swelling, hearing loss, mouth sores, nosebleeds, postnasal drip, rhinorrhea, sinus pressure, sinus pain, sneezing, sore throat, tinnitus, trouble swallowing and voice change.    Eyes:  Negative for photophobia, pain, discharge, redness, itching and visual disturbance.   Respiratory:  Negative for cough, choking, chest tightness, shortness of breath, wheezing and stridor.    Cardiovascular:  Negative for chest pain, palpitations and leg swelling.   Gastrointestinal:  Negative for abdominal distention, abdominal pain, anal bleeding, blood in stool, constipation, diarrhea, nausea, rectal pain and vomiting.   Endocrine: Negative for cold intolerance, heat intolerance, polydipsia, polyphagia and polyuria.   Genitourinary:  Negative for decreased urine volume, difficulty urinating, dysuria, enuresis, flank pain, frequency, genital sores, hematuria and urgency.   Musculoskeletal:  Negative for arthralgias, back pain, gait problem, joint swelling, myalgias, neck pain and neck stiffness.   Skin:  Negative for color change, pallor, rash and wound.   Neurological:  Negative for dizziness, tremors, seizures, syncope, facial asymmetry, speech difficulty, weakness, light-headedness, numbness and headaches.   Hematological:  Negative for adenopathy. Does not bruise/bleed easily.   Psychiatric/Behavioral:  Negative for sleep disturbance.       Objective     /70   Pulse 88   Wt 84.4 kg (186 lb)   BMI 35.14 kg/m²      Physical Exam      Physical Exam  Constitutional:       Appearance: Normal appearance.   Cardiovascular:      Rate and Rhythm: Normal rate and regular rhythm.      Heart sounds: No murmur heard.      No gallop.   Pulmonary:      Effort: No respiratory distress.      Breath sounds: No wheezing or rales.   Abdominal:      General: There is no distension.      Palpations: There is no mass.      Tenderness: There is no abdominal tenderness. There is no guarding.   Musculoskeletal:      Right lower leg: No edema.      Left lower leg: No edema.   Neurological:      Mental Status: She is alert.        Lab Results   Component Value Date    WBC 8.4 10/25/2024    HGB 11.8 (L) 10/25/2024    HCT 39.1 10/25/2024     10/25/2024    CHOL 77 10/25/2024    TRIG 126 10/25/2024    HDL 33.2 10/25/2024    ALT 9 10/25/2024    AST 15 10/25/2024     10/25/2024    K 3.8 10/25/2024     10/25/2024    CREATININE 1.09 (H) 10/25/2024    BUN 16 10/25/2024    CO2 29 10/25/2024    TSH 0.74 10/25/2024    HGBA1C 5.6 04/28/2025     par     Current Outpatient Medications   Medication Instructions    allopurinol (Zyloprim) 300 mg tablet TAKE 1 TABLET BY MOUTH EVERY DAY    amlodipine-valsartan (Exforge)  mg tablet TAKE 1 TABLET BY MOUTH EVERY DAY *PLEASE FOLLOW UP FOR FURTHER REFILLS*    indapamide (LOZOL) 1.25 mg, Every morning    levothyroxine (SYNTHROID, LEVOXYL) 75 mcg, oral, Daily    rosuvastatin (CRESTOR) 5 mg, oral, Daily    semaglutide 2 mg, subcutaneous, Weekly        US pelvis transvaginal  Narrative: Interpreted By:  Carla Garcia,   STUDY:  US PELVIS TRANSVAGINAL; 11/27/2024 2:33 pm      INDICATION:  Signs/Symptoms:ovar cyst.      COMPARISON:  11/13/2023      ACCESSION NUMBER(S):  [id number]      ORDERING CLINICIAN:  KELIN DEL RIO      TECHNIQUE:  Grayscale and color Doppler imaging of the pelvis were performed.  Transabdominal technique was utilized as well as transvaginal  ultrasound to better visualize the adnexa.      FINDINGS:  Uterus:  The uterus is surgically absent.  The right ovary is not visualized transabdominally or transvaginally.  Within the left adnexal region, there is 2.3 x 3.3 x 5.1 cm  complex  cystic lesion containing multiple vascular septations. Previously  this measured 2.1 x 4.2 x 4.8 cm and has not changed significantly  since that time. Small mural nodules are present without interval  change as well.      There is no free fluid in the pelvis.      Impression: Stable complex left ovarian cystic lesion measuring 2.3 x 3.3 x 5.1  cm in size. The possibility of an ovarian neoplasm can not be  excluded. MRI of the pelvis to include contrast administration may be  beneficial.      Status post hysterectomy with nonvisualization of right ovary.      MACRO:  None.      Signed by: Carla Garcia 11/29/2024 7:11 PM  Dictation workstation:   [id number]           Assessment & Plan  1. Hypertension.  - Blood pressure readings have been consistently low at home.  She has occasional dizziness please take medication as prescribed.   - Indapamide will be discontinued, and current regimen should be continued.  - If edema develops, reintroduction of indapamide may be necessary, potentially with a reduction in the dosage of another medication.  - Blood pressure monitoring is advised.    2. Diabetes.  - Hemoglobin A1c level was recorded at 5.6 during the last assessment.  - Under the care of an ophthalmologist.  - A1c test will be conducted in 2 months.  - Transition from Ozempic to Mounjaro is expected to aid in further weight loss and glycemic control.    3. Hyperlipidemia.  - Continuation of the statin therapy is recommended.  - Current medication regimen includes rosuvastatin.  - Lipid levels should be monitored periodically.  - Encouraged to maintain a healthy diet and exercise routine.    4. Obesity.  - Commended for significant weight loss and encouraged to maintain this progress.  - Transition from Ozempic to Mounjaro is expected to aid in further weight loss.  - Regular exercise of 30 minutes, 5 days a week is recommended.  - Weight and BMI should be monitored.    5. Arthritis.  - Arthritis appears to be  well-managed.  - X-rays of the lower back and right hip will be obtained.  - Physical therapy may be considered based on the results.  - Pain management and mobility improvement strategies discussed.    6. Health maintenance.  - Declined immunizations.  - Bone density test needs to be scheduled.  - Colonoscopy due in 01/2026.  - Mammogram completed at the end of 2024.    Follow-up  - Follow up in 2 months.          Arpan Gillespie MD       This medical note was created with the assistance of artificial intelligence (AI) for documentation purposes. The content has been reviewed and confirmed by the healthcare provider for accuracy and completeness. Patient consented to the use of audio recording and use of AI during their visit.

## 2025-06-09 ENCOUNTER — HOSPITAL ENCOUNTER (OUTPATIENT)
Dept: RADIOLOGY | Facility: CLINIC | Age: 70
End: 2025-06-09
Payer: MEDICARE

## 2025-06-12 ENCOUNTER — APPOINTMENT (OUTPATIENT)
Dept: PHARMACY | Facility: HOSPITAL | Age: 70
End: 2025-06-12
Payer: MEDICARE

## 2025-06-12 ENCOUNTER — HOSPITAL ENCOUNTER (OUTPATIENT)
Dept: RADIOLOGY | Facility: CLINIC | Age: 70
Discharge: HOME | End: 2025-06-12
Payer: MEDICARE

## 2025-06-12 DIAGNOSIS — N18.31 CHRONIC KIDNEY DISEASE, STAGE 3A (MULTI): ICD-10-CM

## 2025-06-12 DIAGNOSIS — Z78.0 MENOPAUSE: ICD-10-CM

## 2025-06-12 DIAGNOSIS — I10 BENIGN ESSENTIAL HYPERTENSION: ICD-10-CM

## 2025-06-12 DIAGNOSIS — E66.01 OBESITY, MORBID (MULTI): ICD-10-CM

## 2025-06-12 DIAGNOSIS — R73.9 HYPERGLYCEMIA: ICD-10-CM

## 2025-06-12 DIAGNOSIS — I10 PRIMARY HYPERTENSION: ICD-10-CM

## 2025-06-12 DIAGNOSIS — E78.2 HYPERLIPEMIA, MIXED: ICD-10-CM

## 2025-06-12 DIAGNOSIS — E11.9 TYPE 2 DIABETES MELLITUS WITHOUT COMPLICATION, WITHOUT LONG-TERM CURRENT USE OF INSULIN: ICD-10-CM

## 2025-06-12 PROCEDURE — 77080 DXA BONE DENSITY AXIAL: CPT

## 2025-06-12 RX ORDER — TIRZEPATIDE 5 MG/.5ML
5 INJECTION, SOLUTION SUBCUTANEOUS WEEKLY
Qty: 2 ML | Refills: 0 | Status: SHIPPED | OUTPATIENT
Start: 2025-06-12

## 2025-06-12 ASSESSMENT — ENCOUNTER SYMPTOMS
VISUAL CHANGE: 0
POLYDIPSIA: 0
FATIGUE: 0
BLURRED VISION: 0
DIZZINESS: 1
WEAKNESS: 0
POLYPHAGIA: 0
WEIGHT LOSS: 0

## 2025-06-12 NOTE — PROGRESS NOTES
Clinical Pharmacist Visit    Patient is sent at the request of Dr. Gillespie for my opinion regarding Type 2 diabetes.  My final recommendations will be communicated back to the requesting provider by way of shared medical record.  Subjective     Patient referred for T2DM management. She was recently started on Ozempic with dose titration, tolerating well with occasional constipation.  Her current HgA1c is at goal.     Diabetes  She presents for her follow-up diabetic visit. She has type 2 diabetes mellitus. The initial diagnosis of diabetes was made 10 years ago. Her disease course has been improving. Hypoglycemia symptoms include dizziness. (Reports missing a meal ) Pertinent negatives for diabetes include no blurred vision, no chest pain, no fatigue, no foot paresthesias, no foot ulcerations, no polydipsia, no polyphagia, no polyuria, no visual change, no weakness and no weight loss. Symptoms are improving. She is compliant with treatment all of the time. An ACE inhibitor/angiotensin II receptor blocker is being taken. Eye exam is current.     Past Medical History:  She has a past medical history of Elevated blood-pressure reading, without diagnosis of hypertension, Other conditions influencing health status, Personal history of other diseases of the circulatory system, and Personal history of other diseases of the musculoskeletal system and connective tissue.    Past Surgical History:  She has a past surgical history that includes Hysterectomy (06/30/2014); Foot surgery (06/30/2014); Hand tendon surgery (06/30/2014); Knee surgery (06/30/2014); and Colonoscopy (01/2021).    Social History:  She reports that she quit smoking about 42 years ago. Her smoking use included cigarettes. She started smoking about 59 years ago. She has a 4.3 pack-year smoking history. She has never used smokeless tobacco. She reports that she does not currently use alcohol. She reports that she does not use drugs.    Family History:  Family  History   Problem Relation Name Age of Onset    Aneurysm Mother      Heart failure Father      Kidney disease Brother      Hypertension Other      Diabetes Other       Allergies:  Patient has no known allergies.    Current diet: eating better and smaller portions since starting ozempic, less cravings and endorses improved appetite   Current exercise: walking    The patient does not have a known family history of diabetes.    Diabetes Pharmacotherapy:    Current medications:  Ozempic 2 mg - takes on Sundays     Previous medications:   - Ozempic 1 mg x 1 months 0.5 mg weekly x 2 months - previously x 4 weeks of 0.25 mg   - Metformin  mg BID     SMBG reporting - none   - Previous appt: FBG 94, 97 and she has not had FBG <100 historically     Denies any hypoglycemia     Weight loss:   Wants to walk more, knee pain is worsening   Knee pain worsening with weight loss  Baseline weight: 224  Current weight:   198 lbs  6/12/25: 190  5/27/25 - 189 lbs   1/21/25: 196 lbs   11/26/24: 201-203 lbs  8/22/24: 205 lbs     BP: none at todays appt 6/12/24  Range: 103/68 - 119/75  Last appt: 90/60 - 118/72  6/12/25 - 108/66    Primary/Secondary Prevention   - Statin? Yes  - ACE-I/ARB? Yes  - Aspirin? No - upsets her stomach     Pertinent PMH Review:  - PMH of Pancreatitis: No  - PMH of Retinopathy: No  - PMH of Urinary Tract Infections: No  - PMH of MTC: No    Affordability  - Ozempic is $47 for one month   - Lives in an apartment so no mail order as the door is unreliable (oral meds go from $16 to $0 if mail order)    ]Objective     Last Recorded Vitals:  BP Readings from Last 6 Encounters:   05/30/25 110/70   04/28/25 106/74   10/31/24 110/74   07/25/24 126/78   02/28/24 120/80   11/30/23 124/80      Wt Readings from Last 6 Encounters:   05/30/25 84.4 kg (186 lb)   04/28/25 83.9 kg (185 lb)   01/31/25 88.9 kg (196 lb)   11/13/24 93 kg (205 lb)   10/31/24 93 kg (205 lb)   07/25/24 101 kg (222 lb 9.6 oz)     Lab Results    Component Value Date    BILITOT 0.5 10/25/2024    CALCIUM 10.0 10/25/2024    CO2 29 10/25/2024     10/25/2024    CREATININE 1.09 (H) 10/25/2024    GLUCOSE 91 10/25/2024    ALKPHOS 95 10/25/2024    K 3.8 10/25/2024    PROT 7.3 10/25/2024     10/25/2024    AST 15 10/25/2024    ALT 9 10/25/2024    BUN 16 10/25/2024    ANIONGAP 15 10/25/2024    ALBUMIN 4.4 10/25/2024    GFRF 56 (A) 07/27/2023     Lab Results   Component Value Date    TRIG 126 10/25/2024    CHOL 77 10/25/2024    LDLCALC 19 10/25/2024    HDL 33.2 10/25/2024     Lab Results   Component Value Date    HGBA1C 5.6 04/28/2025    HGBA1C 5.6 01/31/2025    HGBA1C 5.5 10/31/2024     Health Maintenance:   Foot Exam: checks occasionally - due and educated patient on importance  Eye Exam: Completed  Lipid Panel: Completed   Urine Albumin: Completed   Influenza Immunization: refuses   Pneumonia Immunization: refuses    Drug Interactions:  - Allopurinol has a lower starting dose with GFR 30-60 (patient has 54): patient has been on this dose for ~2 years so it is safe to continue therapy, but will need to monitor for any potential toxicity that may occur (rash, pruritus, elevated LFTs, N/D/V, etc)   - To note patient has not had a gout flare in a year and states her current regimen has been integral in controlling her gout flares as they are very painful    Assessment/Plan   Problem List Items Addressed This Visit       Obesity, morbid (Multi)    Relevant Medications    tirzepatide (Mounjaro) 5 mg/0.5 mL pen injector    Other Relevant Orders    Referral to Clinical Pharmacy    Type 2 diabetes mellitus without complication, without long-term current use of insulin    Relevant Medications    tirzepatide (Mounjaro) 5 mg/0.5 mL pen injector    Other Relevant Orders    Referral to Clinical Pharmacy    Chronic kidney disease, stage 3a (Multi)    Relevant Medications    tirzepatide (Mounjaro) 5 mg/0.5 mL pen injector    Other Relevant Orders    Referral to  Clinical Pharmacy    Benign essential hypertension    Relevant Medications    tirzepatide (Mounjaro) 5 mg/0.5 mL pen injector    Other Relevant Orders    Referral to Clinical Pharmacy    Hyperlipemia, mixed    Relevant Medications    tirzepatide (Mounjaro) 5 mg/0.5 mL pen injector    Other Relevant Orders    Referral to Clinical Pharmacy    Hyperglycemia    Relevant Medications    tirzepatide (Mounjaro) 5 mg/0.5 mL pen injector    Other Relevant Orders    Referral to Clinical Pharmacy    Hypertension    Relevant Medications    tirzepatide (Mounjaro) 5 mg/0.5 mL pen injector    Other Relevant Orders    Referral to Clinical Pharmacy     Patient's Hba1c is at goal at 5.6% (<7) and is the same as 3 months prior.     Patient denies adrs at this time and endorses a regular appetite. Will will continue 2 mg Patient has reached a plateau with weight loss, will switch to mounjaro for improved weight loss, T2DM control, and CVD/renal protection.     START: Mounjaro 5 mg once weekly  STOP: Ozempic/Semaglutide to 2 mg once weekly after last dose    Patients diabetes is well controlled and improved with most recent A1c of 5.5 on 10/31/24. Last was 6.8% on 7/25/24 (Goal < 7%).   Compliance at present is estimated to be excellent. Efforts to improve compliance (if necessary) will be directed at increased exercise.  Follow-up: I recommend diabetes care be 3 weeks  After PCP appt  See if having any ADRs  Check in on diet    Mame Bruce, PharmD   Clinical Pharmacist Specialist, Primary Care   Phone: [phone]   Fax: [phone]   Email: [email or web address]    Continue all meds under the continuation of care with the referring provider and clinical pharmacy team.

## 2025-06-19 ENCOUNTER — APPOINTMENT (OUTPATIENT)
Dept: PHARMACY | Facility: HOSPITAL | Age: 70
End: 2025-06-19
Payer: MEDICARE

## 2025-06-19 DIAGNOSIS — E66.01 OBESITY, MORBID (MULTI): ICD-10-CM

## 2025-06-19 DIAGNOSIS — I10 BENIGN ESSENTIAL HYPERTENSION: ICD-10-CM

## 2025-06-19 DIAGNOSIS — R73.9 HYPERGLYCEMIA: ICD-10-CM

## 2025-06-19 DIAGNOSIS — I10 PRIMARY HYPERTENSION: ICD-10-CM

## 2025-06-19 DIAGNOSIS — E11.9 TYPE 2 DIABETES MELLITUS WITHOUT COMPLICATION, WITHOUT LONG-TERM CURRENT USE OF INSULIN: ICD-10-CM

## 2025-06-19 DIAGNOSIS — E78.2 HYPERLIPEMIA, MIXED: ICD-10-CM

## 2025-06-19 DIAGNOSIS — N18.31 CHRONIC KIDNEY DISEASE, STAGE 3A (MULTI): ICD-10-CM

## 2025-06-19 ASSESSMENT — ENCOUNTER SYMPTOMS
POLYDIPSIA: 0
BLURRED VISION: 0
POLYPHAGIA: 0
VISUAL CHANGE: 0
FATIGUE: 0
WEIGHT LOSS: 0
DIZZINESS: 1
WEAKNESS: 0

## 2025-06-19 NOTE — PROGRESS NOTES
Clinical Pharmacist Visit    Patient is sent at the request of Dr. Gillespie for my opinion regarding Type 2 diabetes.  My final recommendations will be communicated back to the requesting provider by way of shared medical record.  Subjective     Patient referred for T2DM management. She was recently started on Ozempic with dose titration, tolerating well with occasional constipation.  Her current HgA1c is at goal.     Diabetes  She presents for her follow-up diabetic visit. She has type 2 diabetes mellitus. The initial diagnosis of diabetes was made 10 years ago. Her disease course has been improving. Hypoglycemia symptoms include dizziness. (Reports missing a meal ) Pertinent negatives for diabetes include no blurred vision, no chest pain, no fatigue, no foot paresthesias, no foot ulcerations, no polydipsia, no polyphagia, no polyuria, no visual change, no weakness and no weight loss. Symptoms are improving. She is compliant with treatment all of the time. An ACE inhibitor/angiotensin II receptor blocker is being taken. Eye exam is current.     Past Medical History:  She has a past medical history of Elevated blood-pressure reading, without diagnosis of hypertension, Other conditions influencing health status, Personal history of other diseases of the circulatory system, and Personal history of other diseases of the musculoskeletal system and connective tissue.    Past Surgical History:  She has a past surgical history that includes Hysterectomy (06/30/2014); Foot surgery (06/30/2014); Hand tendon surgery (06/30/2014); Knee surgery (06/30/2014); and Colonoscopy (01/2021).    Social History:  She reports that she quit smoking about 42 years ago. Her smoking use included cigarettes. She started smoking about 59 years ago. She has a 4.3 pack-year smoking history. She has never used smokeless tobacco. She reports that she does not currently use alcohol. She reports that she does not use drugs.    Family History:  Family  History   Problem Relation Name Age of Onset    Aneurysm Mother      Heart failure Father      Kidney disease Brother      Hypertension Other      Diabetes Other       Allergies:  Patient has no known allergies.    Current diet: eating better and smaller portions since starting ozempic, less cravings and endorses improved appetite   Current exercise: walking    The patient does not have a known family history of diabetes.    Diabetes Pharmacotherapy:    Current medications:  Ozempic 2 mg - takes on Sundays     Previous medications:   - Ozempic 1 mg x 1 months 0.5 mg weekly x 2 months - previously x 4 weeks of 0.25 mg   - Metformin  mg BID     SMBG reporting - none   - Previous appt: FBG 94, 97 and she has not had FBG <100 historically     Denies any hypoglycemia     Weight loss:   Wants to walk more, knee pain is worsening   Knee pain worsening with weight loss  Baseline weight: 224  Current weight:   6/12/25: 190 lbs  5/27/25 - 189 lbs   1/21/25: 196 lbs   11/26/24: 201-203 lbs  8/22/24: 205 lbs     BP: none at todays appt 6/12/24  Range: 103/68 - 119/75  Last appt: 90/60 - 118/72  6/12/25 - 108/66    Primary/Secondary Prevention   - Statin? Yes  - ACE-I/ARB? Yes  - Aspirin? No - upsets her stomach     Pertinent PMH Review:  - PMH of Pancreatitis: No  - PMH of Retinopathy: No  - PMH of Urinary Tract Infections: No  - PMH of MTC: No    Affordability  - Ozempic is $47 for one month   - Lives in an apartment so no mail order as the door is unreliable (oral meds go from $16 to $0 if mail order)    ]Objective     Last Recorded Vitals:  BP Readings from Last 6 Encounters:   05/30/25 110/70   04/28/25 106/74   10/31/24 110/74   07/25/24 126/78   02/28/24 120/80   11/30/23 124/80      Wt Readings from Last 6 Encounters:   05/30/25 84.4 kg (186 lb)   04/28/25 83.9 kg (185 lb)   01/31/25 88.9 kg (196 lb)   11/13/24 93 kg (205 lb)   10/31/24 93 kg (205 lb)   07/25/24 101 kg (222 lb 9.6 oz)     Lab Results   Component  Value Date    BILITOT 0.5 10/25/2024    CALCIUM 10.0 10/25/2024    CO2 29 10/25/2024     10/25/2024    CREATININE 1.09 (H) 10/25/2024    GLUCOSE 91 10/25/2024    ALKPHOS 95 10/25/2024    K 3.8 10/25/2024    PROT 7.3 10/25/2024     10/25/2024    AST 15 10/25/2024    ALT 9 10/25/2024    BUN 16 10/25/2024    ANIONGAP 15 10/25/2024    ALBUMIN 4.4 10/25/2024    GFRF 56 (A) 07/27/2023     Lab Results   Component Value Date    TRIG 126 10/25/2024    CHOL 77 10/25/2024    LDLCALC 19 10/25/2024    HDL 33.2 10/25/2024     Lab Results   Component Value Date    HGBA1C 5.6 04/28/2025    HGBA1C 5.6 01/31/2025    HGBA1C 5.5 10/31/2024     Health Maintenance:   Foot Exam: checks occasionally - due and educated patient on importance  Eye Exam: Completed  Lipid Panel: Completed   Urine Albumin: Completed   Influenza Immunization: refuses   Pneumonia Immunization: refuses    Drug Interactions:  - Allopurinol has a lower starting dose with GFR 30-60 (patient has 54): patient has been on this dose for ~2 years so it is safe to continue therapy, but will need to monitor for any potential toxicity that may occur (rash, pruritus, elevated LFTs, N/D/V, etc)   - To note patient has not had a gout flare in a year and states her current regimen has been integral in controlling her gout flares as they are very painful    Assessment/Plan   Problem List Items Addressed This Visit    None    Patient's Hba1c is at goal at 5.6% (<7) and is the same as 3 months prior.     Patient denies adrs at this time and endorses a regular appetite. Will will continue 2 mg Patient has reached a plateau with weight loss, will switch to mounjaro for improved weight loss, T2DM control, and CVD/renal protection.     Will start this Sunday --> Mounjaro 5 mg once weekly    Patients diabetes is well controlled and improved with most recent A1c of 5.5 on 10/31/24. Last was 6.8% on 7/25/24 (Goal < 7%).   Compliance at present is estimated to be excellent.  Efforts to improve compliance (if necessary) will be directed at increased exercise.  Follow-up: I recommend diabetes care be 3 weeks  After PCP appt  See if having any ADRs  Check in on diet    Mame Bruce, PharmD   Clinical Pharmacist Specialist, Primary Care   Phone: [phone]   Fax: [phone]   Email: [email or web address]    Continue all meds under the continuation of care with the referring provider and clinical pharmacy team.

## 2025-07-10 ENCOUNTER — APPOINTMENT (OUTPATIENT)
Dept: PHARMACY | Facility: HOSPITAL | Age: 70
End: 2025-07-10
Payer: MEDICARE

## 2025-07-10 DIAGNOSIS — N18.31 CHRONIC KIDNEY DISEASE, STAGE 3A (MULTI): ICD-10-CM

## 2025-07-10 DIAGNOSIS — I10 BENIGN ESSENTIAL HYPERTENSION: ICD-10-CM

## 2025-07-10 DIAGNOSIS — R73.9 HYPERGLYCEMIA: ICD-10-CM

## 2025-07-10 DIAGNOSIS — I10 ESSENTIAL (PRIMARY) HYPERTENSION: ICD-10-CM

## 2025-07-10 DIAGNOSIS — E78.2 HYPERLIPEMIA, MIXED: ICD-10-CM

## 2025-07-10 DIAGNOSIS — E11.9 TYPE 2 DIABETES MELLITUS WITHOUT COMPLICATION, WITHOUT LONG-TERM CURRENT USE OF INSULIN: Primary | ICD-10-CM

## 2025-07-10 RX ORDER — TIRZEPATIDE 7.5 MG/.5ML
7.5 INJECTION, SOLUTION SUBCUTANEOUS WEEKLY
Qty: 2 ML | Refills: 0 | Status: SHIPPED | OUTPATIENT
Start: 2025-07-10

## 2025-07-10 ASSESSMENT — ENCOUNTER SYMPTOMS
VISUAL CHANGE: 0
POLYDIPSIA: 0
POLYPHAGIA: 0
WEIGHT LOSS: 0
WEAKNESS: 0
DIZZINESS: 1
BLURRED VISION: 0
FATIGUE: 0

## 2025-07-10 NOTE — PROGRESS NOTES
Clinical Pharmacist Visit    Patient is sent at the request of Dr. Gillespie for my opinion regarding Type 2 diabetes.  My final recommendations will be communicated back to the requesting provider by way of shared medical record.  Subjective     Patient referred for T2DM management. She was recently started on Ozempic with dose titration, tolerating well with occasional constipation.  Her current HgA1c is at goal.     Diabetes  She presents for her follow-up diabetic visit. She has type 2 diabetes mellitus. The initial diagnosis of diabetes was made 10 years ago. Her disease course has been improving. Hypoglycemia symptoms include dizziness. (Reports missing a meal ) Pertinent negatives for diabetes include no blurred vision, no chest pain, no fatigue, no foot paresthesias, no foot ulcerations, no polydipsia, no polyphagia, no polyuria, no visual change, no weakness and no weight loss. Symptoms are improving. She is compliant with treatment all of the time. An ACE inhibitor/angiotensin II receptor blocker is being taken. Eye exam is current.     Past Medical History:  She has a past medical history of Elevated blood-pressure reading, without diagnosis of hypertension, Other conditions influencing health status, Personal history of other diseases of the circulatory system, and Personal history of other diseases of the musculoskeletal system and connective tissue.    Past Surgical History:  She has a past surgical history that includes Hysterectomy (06/30/2014); Foot surgery (06/30/2014); Hand tendon surgery (06/30/2014); Knee surgery (06/30/2014); and Colonoscopy (01/2021).    Social History:  She reports that she quit smoking about 42 years ago. Her smoking use included cigarettes. She started smoking about 59 years ago. She has a 4.3 pack-year smoking history. She has never used smokeless tobacco. She reports that she does not currently use alcohol. She reports that she does not use drugs.    Family History:  Family  History   Problem Relation Name Age of Onset    Aneurysm Mother      Heart failure Father      Kidney disease Brother      Hypertension Other      Diabetes Other       Allergies:  Patient has no known allergies.    Current diet: eating better and smaller portions since starting ozempic, less cravings and endorses improved appetite   Current exercise: walking    The patient does not have a known family history of diabetes.    Diabetes Pharmacotherapy:    Current medications:  Mounjaro 5 mg- takes on Sundays x 3 doses    Previous medications:   - Ozempic 1 mg x 1 months 0.5 mg weekly x 2 months - previously x 4 weeks of 0.25 mg   - Metformin  mg BID     SMBG reporting - none   - Previous appt: FBG 94, 97 and she has not had FBG <100 historically     Denies any hypoglycemia     Weight loss:   Wants to walk more, knee pain is worsening   Knee pain worsening with weight loss  Baseline weight: 224 lbs  Current weight:   7/10/25: 186 lbs  6/12/25: 190 lbs  5/27/25: 189 lbs   1/21/25: 196 lbs   11/26/24: 201-203 lbs  8/22/24: 205 lbs     BP: none at todays appt 7/10/25  Range: 103/68 - 119/75  Last appt: 90/60 - 118/72  6/12/25 - 108/66    Primary/Secondary Prevention   - Statin? Yes  - ACE-I/ARB? Yes  - Aspirin? No - upsets her stomach     Pertinent PMH Review:  - PMH of Pancreatitis: No  - PMH of Retinopathy: No  - PMH of Urinary Tract Infections: No  - PMH of MTC: No    Affordability  - Ozempic is $47 for one month   - Lives in an apartment so no mail order as the door is unreliable (oral meds go from $16 to $0 if mail order)    Objective     Last Recorded Vitals:  BP Readings from Last 6 Encounters:   05/30/25 110/70   04/28/25 106/74   10/31/24 110/74   07/25/24 126/78   02/28/24 120/80   11/30/23 124/80      Wt Readings from Last 6 Encounters:   05/30/25 84.4 kg (186 lb)   04/28/25 83.9 kg (185 lb)   01/31/25 88.9 kg (196 lb)   11/13/24 93 kg (205 lb)   10/31/24 93 kg (205 lb)   07/25/24 101 kg (222 lb 9.6 oz)      Lab Results   Component Value Date    BILITOT 0.5 10/25/2024    CALCIUM 10.0 10/25/2024    CO2 29 10/25/2024     10/25/2024    CREATININE 1.09 (H) 10/25/2024    GLUCOSE 91 10/25/2024    ALKPHOS 95 10/25/2024    K 3.8 10/25/2024    PROT 7.3 10/25/2024     10/25/2024    AST 15 10/25/2024    ALT 9 10/25/2024    BUN 16 10/25/2024    ANIONGAP 15 10/25/2024    ALBUMIN 4.4 10/25/2024    GFRF 56 (A) 07/27/2023     Lab Results   Component Value Date    TRIG 126 10/25/2024    CHOL 77 10/25/2024    LDLCALC 19 10/25/2024    HDL 33.2 10/25/2024     Lab Results   Component Value Date    HGBA1C 5.6 04/28/2025    HGBA1C 5.6 01/31/2025    HGBA1C 5.5 10/31/2024     Health Maintenance:   Foot Exam: checks occasionally - due and educated patient on importance  Eye Exam: Completed  Lipid Panel: Completed   Urine Albumin: Completed   Influenza Immunization: refuses   Pneumonia Immunization: refuses    Drug Interactions:  - Allopurinol has a lower starting dose with GFR 30-60 (patient has 54): patient has been on this dose for ~2 years so it is safe to continue therapy, but will need to monitor for any potential toxicity that may occur (rash, pruritus, elevated LFTs, N/D/V, etc)   - To note patient has not had a gout flare in a year and states her current regimen has been integral in controlling her gout flares as they are very painful    Assessment/Plan   Problem List Items Addressed This Visit       Type 2 diabetes mellitus without complication, without long-term current use of insulin - Primary    Relevant Medications    tirzepatide (Mounjaro) 7.5 mg/0.5 mL pen injector    Other Relevant Orders    Referral to Clinical Pharmacy    Chronic kidney disease, stage 3a (Multi)    Relevant Medications    tirzepatide (Mounjaro) 7.5 mg/0.5 mL pen injector    Other Relevant Orders    Referral to Clinical Pharmacy    Benign essential hypertension    Relevant Medications    tirzepatide (Mounjaro) 7.5 mg/0.5 mL pen injector    Other  Relevant Orders    Referral to Clinical Pharmacy    Hyperlipemia, mixed    Relevant Medications    tirzepatide (Mounjaro) 7.5 mg/0.5 mL pen injector    Other Relevant Orders    Referral to Clinical Pharmacy    Hyperglycemia    Relevant Medications    tirzepatide (Mounjaro) 7.5 mg/0.5 mL pen injector    Other Relevant Orders    Referral to Clinical Pharmacy     Other Visit Diagnoses         Essential (primary) hypertension        Relevant Medications    tirzepatide (Mounjaro) 7.5 mg/0.5 mL pen injector    Other Relevant Orders    Referral to Clinical Pharmacy        Patient denies adrs at this time and endorses a regular appetite. Continue mounjaro titration for improved weight loss, T2DM control, and CVD/renal protection. Patient states she may have some water retention since she stopped taking her diuretic, will follow up after pcp appt    INCREASE --> Mounjaro 7.5 mg once weekly    Patients diabetes is well controlled and improved with most recent A1c of 5.4% on 4/28/25 improving from 5.6% on 10/31/24. (Goal < 7%).   Compliance at present is estimated to be excellent. Efforts to improve compliance (if necessary) will be directed at increased exercise.  Follow-up: I recommend diabetes care be 3 weeks  After PCP appt  Check in on diet    Mame Bruce, PharmD   Clinical Pharmacist Specialist, Primary Care   Phone: [phone]   Fax: [phone]   Email: [email or web address]    Continue all meds under the continuation of care with the referring provider and clinical pharmacy team.

## 2025-08-01 ENCOUNTER — APPOINTMENT (OUTPATIENT)
Dept: PRIMARY CARE | Facility: CLINIC | Age: 70
End: 2025-08-01
Payer: MEDICARE

## 2025-08-07 ENCOUNTER — APPOINTMENT (OUTPATIENT)
Dept: PHARMACY | Facility: HOSPITAL | Age: 70
End: 2025-08-07
Payer: MEDICARE

## 2025-08-07 DIAGNOSIS — E78.2 HYPERLIPEMIA, MIXED: ICD-10-CM

## 2025-08-07 DIAGNOSIS — E66.01 OBESITY, MORBID (MULTI): ICD-10-CM

## 2025-08-07 DIAGNOSIS — R73.9 HYPERGLYCEMIA: ICD-10-CM

## 2025-08-07 DIAGNOSIS — N18.31 CHRONIC KIDNEY DISEASE, STAGE 3A (MULTI): ICD-10-CM

## 2025-08-07 DIAGNOSIS — I10 ESSENTIAL (PRIMARY) HYPERTENSION: ICD-10-CM

## 2025-08-07 DIAGNOSIS — E11.9 TYPE 2 DIABETES MELLITUS WITHOUT COMPLICATION, WITHOUT LONG-TERM CURRENT USE OF INSULIN: Primary | ICD-10-CM

## 2025-08-07 RX ORDER — TIRZEPATIDE 10 MG/.5ML
10 INJECTION, SOLUTION SUBCUTANEOUS WEEKLY
Qty: 2 ML | Refills: 0 | Status: SHIPPED | OUTPATIENT
Start: 2025-08-07

## 2025-08-07 ASSESSMENT — ENCOUNTER SYMPTOMS
WEAKNESS: 0
VISUAL CHANGE: 0
COUGH: 1
POLYPHAGIA: 0
BLURRED VISION: 0
WEIGHT LOSS: 0
DIZZINESS: 1
FATIGUE: 0
POLYDIPSIA: 0

## 2025-08-07 NOTE — PROGRESS NOTES
Clinical Pharmacist Visit    Patient is sent at the request of Dr. Gillespie for my opinion regarding Type 2 diabetes.  My final recommendations will be communicated back to the requesting provider by way of shared medical record.  Subjective     Patient referred for T2DM management. She was recently started on Ozempic with dose titration, tolerating well with occasional constipation.  Her current HgA1c is at goal.     Diabetes  She presents for her follow-up diabetic visit. She has type 2 diabetes mellitus. The initial diagnosis of diabetes was made 10 years ago. Her disease course has been improving. Hypoglycemia symptoms include dizziness. (Reports missing a meal ) Pertinent negatives for diabetes include no blurred vision, no chest pain, no fatigue, no foot paresthesias, no foot ulcerations, no polydipsia, no polyphagia, no polyuria, no visual change, no weakness and no weight loss. Symptoms are improving. She is compliant with treatment all of the time. An ACE inhibitor/angiotensin II receptor blocker is being taken. Eye exam is current.     Past Medical History:  She has a past medical history of Elevated blood-pressure reading, without diagnosis of hypertension, Other conditions influencing health status, Personal history of other diseases of the circulatory system, and Personal history of other diseases of the musculoskeletal system and connective tissue.    Past Surgical History:  She has a past surgical history that includes Hysterectomy (06/30/2014); Foot surgery (06/30/2014); Hand tendon surgery (06/30/2014); Knee surgery (06/30/2014); and Colonoscopy (01/2021).    Social History:  She reports that she quit smoking about 42 years ago. Her smoking use included cigarettes. She started smoking about 59 years ago. She has a 4.3 pack-year smoking history. She has never used smokeless tobacco. She reports that she does not currently use alcohol. She reports that she does not use drugs.  She is a , sedentary      Family History:  Family History   Problem Relation Name Age of Onset    Aneurysm Mother      Heart failure Father      Kidney disease Brother      Hypertension Other      Diabetes Other       Allergies:  Patient has no known allergies.    Current diet: eating better and smaller portions since starting ozempic, less cravings and endorses improved appetite   Current exercise: walking    The patient does not have a known family history of diabetes.    Diabetes Pharmacotherapy:    Current medications:  Mounjaro 7.5 mg- takes on Sundays x 3 doses    Previous medications:   - Ozempic 1 mg x 1 months 0.5 mg weekly x 2 months - previously x 4 weeks of 0.25 mg   - Metformin  mg BID     SMBG reporting - none   - Previous appt: FBG 94, 97 and she has not had FBG <100 historically   - 111 bedtime  - Average: 90s    Denies any hypoglycemia     Lifestyle   , sedentary   Prior would take grand daughter to school.   Appetite is back, craving sugars  Has a few mini-marshmallows  No appetite for chocolate/candy bars    Weight loss:   Wants to walk more, knee pain is worsening   Knee pain worsening with weight loss  Baseline weight: 224 lbs  Current weight:   8/7/25: 193 lbs  7/10/25: 186 lbs  6/12/25: 190 lbs  5/27/25: 189 lbs   1/21/25: 196 lbs   11/26/24: 201-203 lbs  8/22/24: 205 lbs     BP: none at todays appt  8/7/25  This AM - 112/72; 78  112/72, 110/70, 116/75, 113/72  Range: 10/70, -  65 - 125/75  Range: 103/68 - 119/75  Last appt: 90/60 - 118/72  6/12/25 - 108/66    Primary/Secondary Prevention   Statin? Yes  ACE-I/ARB? Yes  Aspirin? No - upsets her stomach     Pertinent PMH Review:  - PMH of Pancreatitis: No  - PMH of Retinopathy: No  - PMH of Urinary Tract Infections: No  - PMH of MTC: No    Affordability  - Ozempic is $47 for one month   - Lives in an apartment so no mail order as the door is unreliable (oral meds go from $16 to $0 if mail order)    Review of Systems   Constitutional:  Negative for fatigue  and weight loss.   Eyes:  Negative for blurred vision.   Respiratory:  Positive for cough.    Cardiovascular:  Positive for leg swelling. Negative for chest pain.   Endocrine: Negative for polydipsia, polyphagia and polyuria.   Neurological:  Positive for dizziness. Negative for weakness.        Objective     Last Recorded Vitals:  BP Readings from Last 6 Encounters:   05/30/25 110/70   04/28/25 106/74   10/31/24 110/74   07/25/24 126/78   02/28/24 120/80   11/30/23 124/80      Wt Readings from Last 6 Encounters:   05/30/25 84.4 kg (186 lb)   04/28/25 83.9 kg (185 lb)   01/31/25 88.9 kg (196 lb)   11/13/24 93 kg (205 lb)   10/31/24 93 kg (205 lb)   07/25/24 101 kg (222 lb 9.6 oz)     Lab Results   Component Value Date    BILITOT 0.5 10/25/2024    CALCIUM 10.0 10/25/2024    CO2 29 10/25/2024     10/25/2024    CREATININE 1.09 (H) 10/25/2024    GLUCOSE 91 10/25/2024    ALKPHOS 95 10/25/2024    K 3.8 10/25/2024    PROT 7.3 10/25/2024     10/25/2024    AST 15 10/25/2024    ALT 9 10/25/2024    BUN 16 10/25/2024    ANIONGAP 15 10/25/2024    ALBUMIN 4.4 10/25/2024    GFRF 56 (A) 07/27/2023     Lab Results   Component Value Date    TRIG 126 10/25/2024    CHOL 77 10/25/2024    LDLCALC 19 10/25/2024    HDL 33.2 10/25/2024     Lab Results   Component Value Date    HGBA1C 5.6 04/28/2025    HGBA1C 5.6 01/31/2025    HGBA1C 5.5 10/31/2024     Health Maintenance:   Foot Exam: checks occasionally - due and educated patient on importance  Eye Exam: Completed  Lipid Panel: Completed   Urine Albumin: Completed   Influenza Immunization: refuses   Pneumonia Immunization: refuses    Drug Interactions:  - Allopurinol has a lower starting dose with GFR 30-60 (patient has 54): patient has been on this dose for ~2 years so it is safe to continue therapy, but will need to monitor for any potential toxicity that may occur (rash, pruritus, elevated LFTs, N/D/V, etc)   - To note patient has not had a gout flare in a year and states her  current regimen has been integral in controlling her gout flares as they are very painful    Assessment/Plan   Problem List Items Addressed This Visit       Obesity, morbid (Multi)    Type 2 diabetes mellitus without complication, without long-term current use of insulin - Primary    Chronic kidney disease, stage 3a (Multi)    Hyperlipemia, mixed    Hyperglycemia     Other Visit Diagnoses         Essential (primary) hypertension              Patient denies adrs at this time and endorses an increased appetite and weight gain due to a more sedentary lifestyle. Continue mounjaro titration for improved weight loss, T2DM control, and CVD/renal protection.     INCREASE --> Mounjaro 10 mg once weekly    Patient has increased swelling in lower extremities; bp is WNL; recommend discontinuing CCB aspect of exforge, continue valsartan, and add hydrochlorothiazide 12.5 mg. CCB can exacerbate or cause edema at any point during treatment, of note diuretic was discontinued at last PCP appt due to very low bp. May benefit from a diuretic and would not want to add another medication as it will; cause hypotension in the patient as it did prior.     Patients diabetes is well controlled and improved with most recent A1c of 5.4% on 4/28/25 improving from 5.6% on 10/31/24. (Goal < 7%).   Compliance at present is estimated to be excellent. Efforts to improve compliance (if necessary) will be directed at increased exercise.  Follow-up: I recommend diabetes care be 4 weeks  Check in on diet    Mame Bruce, PharmD   Clinical Pharmacist Specialist, Primary Care   Phone: [phone]   Fax: [phone]   Email: [email or web address]    Continue all meds under the continuation of care with the referring provider and clinical pharmacy team.

## 2025-08-22 ENCOUNTER — APPOINTMENT (OUTPATIENT)
Dept: PRIMARY CARE | Facility: CLINIC | Age: 70
End: 2025-08-22
Payer: MEDICARE

## 2025-09-11 ENCOUNTER — APPOINTMENT (OUTPATIENT)
Dept: PHARMACY | Facility: HOSPITAL | Age: 70
End: 2025-09-11
Payer: MEDICARE

## 2026-01-09 ENCOUNTER — APPOINTMENT (OUTPATIENT)
Dept: PRIMARY CARE | Facility: CLINIC | Age: 71
End: 2026-01-09
Payer: MEDICARE